# Patient Record
Sex: MALE | Race: WHITE | Employment: FULL TIME | URBAN - METROPOLITAN AREA
[De-identification: names, ages, dates, MRNs, and addresses within clinical notes are randomized per-mention and may not be internally consistent; named-entity substitution may affect disease eponyms.]

---

## 2017-01-01 ENCOUNTER — HOSPITAL ENCOUNTER (OUTPATIENT)
Dept: OTHER | Age: 52
Discharge: HOME | End: 2017-01-31
Attending: ORTHOPAEDIC SURGERY | Admitting: ORTHOPAEDIC SURGERY

## 2017-05-15 ENCOUNTER — OFFICE VISIT (OUTPATIENT)
Dept: ORTHOPEDIC SURGERY | Age: 52
End: 2017-05-15

## 2017-05-15 VITALS
HEIGHT: 73 IN | DIASTOLIC BLOOD PRESSURE: 93 MMHG | HEART RATE: 73 BPM | WEIGHT: 220 LBS | BODY MASS INDEX: 29.16 KG/M2 | SYSTOLIC BLOOD PRESSURE: 143 MMHG | TEMPERATURE: 97.5 F

## 2017-05-15 DIAGNOSIS — Z96.641 H/O TOTAL HIP ARTHROPLASTY, RIGHT: Primary | ICD-10-CM

## 2017-05-15 PROCEDURE — 99213 OFFICE O/P EST LOW 20 MIN: CPT | Performed by: ORTHOPAEDIC SURGERY

## 2018-06-28 ENCOUNTER — OFFICE VISIT (OUTPATIENT)
Dept: ORTHOPEDIC SURGERY | Age: 53
End: 2018-06-28

## 2018-06-28 VITALS
RESPIRATION RATE: 16 BRPM | HEART RATE: 80 BPM | TEMPERATURE: 98.8 F | SYSTOLIC BLOOD PRESSURE: 143 MMHG | HEIGHT: 73 IN | DIASTOLIC BLOOD PRESSURE: 85 MMHG | WEIGHT: 203 LBS | BODY MASS INDEX: 26.9 KG/M2

## 2018-06-28 DIAGNOSIS — Z96.641 H/O TOTAL HIP ARTHROPLASTY, RIGHT: Primary | ICD-10-CM

## 2018-06-28 PROCEDURE — G8427 DOCREV CUR MEDS BY ELIG CLIN: HCPCS | Performed by: PHYSICIAN ASSISTANT

## 2018-06-28 PROCEDURE — 99213 OFFICE O/P EST LOW 20 MIN: CPT | Performed by: PHYSICIAN ASSISTANT

## 2018-06-28 PROCEDURE — 1036F TOBACCO NON-USER: CPT | Performed by: PHYSICIAN ASSISTANT

## 2018-06-28 PROCEDURE — 3017F COLORECTAL CA SCREEN DOC REV: CPT | Performed by: PHYSICIAN ASSISTANT

## 2018-06-28 PROCEDURE — G8419 CALC BMI OUT NRM PARAM NOF/U: HCPCS | Performed by: PHYSICIAN ASSISTANT

## 2019-08-26 ENCOUNTER — OFFICE VISIT (OUTPATIENT)
Dept: ORTHOPEDIC SURGERY | Age: 54
End: 2019-08-26
Payer: COMMERCIAL

## 2019-08-26 VITALS
HEIGHT: 73 IN | WEIGHT: 230 LBS | BODY MASS INDEX: 30.48 KG/M2 | HEART RATE: 89 BPM | SYSTOLIC BLOOD PRESSURE: 139 MMHG | DIASTOLIC BLOOD PRESSURE: 89 MMHG | TEMPERATURE: 98.8 F

## 2019-08-26 DIAGNOSIS — Z96.641 HISTORY OF TOTAL HIP REPLACEMENT, RIGHT: Primary | ICD-10-CM

## 2019-08-26 PROCEDURE — 3017F COLORECTAL CA SCREEN DOC REV: CPT | Performed by: ORTHOPAEDIC SURGERY

## 2019-08-26 PROCEDURE — G8419 CALC BMI OUT NRM PARAM NOF/U: HCPCS | Performed by: ORTHOPAEDIC SURGERY

## 2019-08-26 PROCEDURE — G8427 DOCREV CUR MEDS BY ELIG CLIN: HCPCS | Performed by: ORTHOPAEDIC SURGERY

## 2019-08-26 PROCEDURE — 1036F TOBACCO NON-USER: CPT | Performed by: ORTHOPAEDIC SURGERY

## 2019-08-26 PROCEDURE — 99213 OFFICE O/P EST LOW 20 MIN: CPT | Performed by: ORTHOPAEDIC SURGERY

## 2019-08-26 NOTE — PROGRESS NOTES
This dictation was done with doggylooton dictation and may contain mechanical errors related to translation. Blood pressure 139/89, pulse 89, temperature 98.8 °F (37.1 °C), height 6' 1\" (1.854 m), weight 230 lb (104.3 kg).

## 2020-10-13 ENCOUNTER — TELEPHONE (OUTPATIENT)
Dept: ORTHOPEDIC SURGERY | Age: 55
End: 2020-10-13

## 2020-12-10 ENCOUNTER — PREP FOR PROCEDURE (OUTPATIENT)
Dept: ORTHOPEDIC SURGERY | Age: 55
End: 2020-12-10

## 2020-12-10 ENCOUNTER — OFFICE VISIT (OUTPATIENT)
Dept: ORTHOPEDIC SURGERY | Age: 55
End: 2020-12-10
Payer: COMMERCIAL

## 2020-12-10 VITALS — WEIGHT: 230 LBS | HEIGHT: 73 IN | BODY MASS INDEX: 30.48 KG/M2 | TEMPERATURE: 98.6 F

## 2020-12-10 PROBLEM — M16.12 PRIMARY OSTEOARTHRITIS OF LEFT HIP: Status: ACTIVE | Noted: 2020-12-10

## 2020-12-10 PROBLEM — Z96.641 HISTORY OF RIGHT HIP REPLACEMENT: Status: ACTIVE | Noted: 2020-12-10

## 2020-12-10 PROCEDURE — G8484 FLU IMMUNIZE NO ADMIN: HCPCS | Performed by: PHYSICIAN ASSISTANT

## 2020-12-10 PROCEDURE — G8419 CALC BMI OUT NRM PARAM NOF/U: HCPCS | Performed by: PHYSICIAN ASSISTANT

## 2020-12-10 PROCEDURE — 3017F COLORECTAL CA SCREEN DOC REV: CPT | Performed by: PHYSICIAN ASSISTANT

## 2020-12-10 PROCEDURE — G8427 DOCREV CUR MEDS BY ELIG CLIN: HCPCS | Performed by: PHYSICIAN ASSISTANT

## 2020-12-10 PROCEDURE — 99214 OFFICE O/P EST MOD 30 MIN: CPT | Performed by: PHYSICIAN ASSISTANT

## 2020-12-10 PROCEDURE — 1036F TOBACCO NON-USER: CPT | Performed by: PHYSICIAN ASSISTANT

## 2020-12-10 NOTE — LETTER
[facility]  Surgery Scheduling Form:  [facility]    DEMOGRAPHICS:                                                                                                                  Patient Name:  Bhavesh Galicia  Patient :  1965   Patient SS#:      Patient Phone:  [phone] (home) [phone] (work)                            Patient Address:  [id number] [facility] [address]    PCP:  [address] [place] [address] Ehrhardt:   Payor/Plan Subscr  Sex Relation Sub. Ins. ID Effective Group Num   1. [facility]* 1965 Male Self [id number]                                   [address]     DIAGNOSIS & PROCEDURE:                                                                                              Diagnosis:   Left hip arthritis    M16.12  Operation:  Left Anterior Total Hip Replacement with C-Arm   [address]  Location: [facility]  Surgeon:  Pastor Compa MD    [facility] INFORMATION:                                                                                         .  Surgeon's Scheduling Instruction:  elective    Requested Date:    OR Time:   Patient Arrival Time:      OR Time Required:  90  Minutes  Anesthesia:  General  Equipment:  Freeland Table, Depuy, advanced            COVID:  Mini C-Arm:  No   Standard C-Arm:  Yes  Status:  OUTPATIENT  PAT Required:  Yes  Comments: NO femoral nerve block    ALLERGIES:Patient has no known allergies.                      Blease Blizzard, MD      12/10/20 8:26 AM  BILLING INFORMATION:                                                                                                       Procedure:       CPT Code Modifier    With Mike Zeng [id number] [address]                                              H&P AT :      PCP ___XX__                  URGENT CARE ______    Bhavesh Galicia LEFT TOTAL HIP REPLACEMENT               1965                                                 PHYSICIANS ORDER HEIGHT  6'1                  WEIGHT   230     ALLERGIES:Patient has no known allergies. SURG              JET                            ________________________________________________________________  PRE-OP ORDERS:  ? CBC WITH DIFFERENTIAL                                                ? TYPE AND SCREEN   ? VITAMIN D LEVELS  ? PREALBUMIN AND ALBUMIN LEVELS                                                           ? HgB A1C                                                                               ? EKG                                                                                        ? NASAL CULUTRE MRSA  ? UAR/if positive repeat UAR on admissioin  ? BMP  ? COAG PROFILE  ? SED RATE  ? PT/OT EVAL AND TEACHING  ? INSTRUCT PT TO STOP ALL NSAIDS, ASPIRIN, BLOOD THINNERS 7 DAYS PRIOR SURGERY  DAY OF SURGERY  ? CEFAZOLIN 2 GM IVPB; IF PATIENT WEIGHS > 80 KG AND SERUM CREATININE <2.5 mg/dl, GIVE 2 GM DOSE WITHIN 1 HOUR OF INCISION. ? IF THE PRE-OP NASAL CULTURE FOR MRSA WAS POSITIVE:   REPEAT NASAL SWAB ON ADMISSION AND ADMINISTER VANCOMYCIN 1 GM IVPB, REDUCE THE DOSE OF VANCOMYCIN  MG IVPB IF PT < 55 KG OR SERUM CREATININE > 2mg/dl; Also to get 2 GM Cefazolin or wt based  ? All patients will receive preop Cefazolin 2 GM or wt based  ? APPLY KNEE HIGH ANTI-EMBOLIC AND PNEUMO-BOOTS TO UNOPERATIVE  LEG  ? CELEBREX  200 MG  ORALLY  DAY OF SURGERY  ?  ROXICODONE 10MG  ORALLY DAY OF SURGERY    OTHER ORDERS:  ____________________________________________________________  PHYSICIAN SIGNATURE:   12/10/2020 8:26 AM   __________________________DATE:         Supplemental Confidentiality & Payment Agreement & Supplemental HIPAA Notice for Shared Medical Appointments During shared medical appointments, you will hear about other participants health issues and personal information. As a matter of trust, it is your duty to keep everything you hear confidential.  Nothing that identifies a participant in any way (including job, ethnicity, Mandaeism, etc.) can be shared outside of this group setting. I have read and agree to the following statements:        · I agree to meet with a group of patients and my doctor. I understand that I have the choice to be seen by my physician in this group or individually and that I may leave the group visit anytime I feel uncomfortable. · I understand that discussions will occur regarding individual identifiable health information during the shared medical appointment and I will maintain the confidentiality of [facility] health information or other information heard during the appointment. · I am committed to maintaining this confidentiality even if I am no longer participating in shared medical appointments. · I understand that the information I share with the physician and staff will be heard by the other participants and there is a possibility that the information disclosed in the shared medical appointment may be re-disclosed by other participants. I have been notified of this potential disclosure and I voluntarily agree to participate in the shared medical appointment. · I know that I dont have to share any personal information with the group or health care providers unless, I choose to do so. · Like any doctors appointment, I agree to be responsible for the bill and / or co-payment associated with this physician appointment. Shared Medical Appointment              THIS SUPPLEMENTAL NOTICE SUPPLEMENTS AND DOES NOT REPLACE THE [facility] CONSENT, PATIENT RESPONSIBILITY AND NOTICE OF PRIVACY PRACTICE.         Mkora Agent    1965 Patients Signature: ____________________________________________________         DATE: ____/____/___      Florenica List / Support Persons Signature (if applicable) _________________________     DATE: __/__/__    **Each person will be asked to sign this commitment before each Shared Medical Appointment. Thank You ! SURGERY SCHEDULING TIMES                                                                                                                                                      Sarah Saunders                                                                                       1965                                                                           SURGERY DATE: ___/___/___                                                                      SURGERY TIME:  ___:___ AM/PM                                                                      ARRIVAL TIME:   ___:___  AM/PM                                                                                                                       **REPORT TO THE INFORMATION                                                  DESK IN THE MAIN LOBBY OF [id number] [facility]  Surgery Scheduler    [facility]) Physicians  [facility]  [address] [place] [address]  [employer]    [phone]  Phone                       JET INFO     PT NAME:  Sarah Saunders              Patient Phone:  [phone] (home) [phone] (work)                                          1965    PCP:  PCP:  Maricruz Nogueira                APPT DATE:  ___/___/___      DATE:  12/10/20        H&P __________    LABS: _________                      NEEDED:  __________ EKG:   _________                     NEEDED:  __________      JET DATE:   _______/_______      SURG DATE:   ________/________

## 2020-12-11 NOTE — PROGRESS NOTES
Subjective:      Patient ID: Lakia Beck is a 54 y.o.  male. Chief Complaint   Patient presents with    Follow-up     right total hip arthroplasty 10/19/16        HPI: He is here for follow-up on his right hip arthroplasty performed by Dr. Marcela Sultana 10/19/2016. Right hip is doing well, no issues or complaints. He has progressively worsening left hip pain. Left hip symptoms started approximately 2 years ago. He has noticed decreased range of motion, increased pain with prolonged standing and walking. Pain currently 8/10 in the left groin. He does have a family history for arthritis. He denies any prior history of injury or prior left hip surgery. He denies diabetes, tobacco, narcotic use. He does have a history of obstructive sleep apnea. He denies any allergies to penicillin. He denies any history of prior DVT. He does have occasional low back pain. Review of Systems:   Negative for fever or chills. Negative for numbness or tingling in the lower extremity. Past Medical History:   Diagnosis Date    Arthritis     High blood pressure     Hyperlipidemia     Skin cancer of scalp        Family History   Problem Relation Age of Onset    Cancer Father     High Blood Pressure Other        Past Surgical History:   Procedure Laterality Date    EYE SURGERY Bilateral     eye muscle as a child    JOINT REPLACEMENT Right 10/19/2016    anterior    SHOULDER SURGERY      TUMOR EXCISION      ca of scalp       Social History     Occupational History    Occupation:    Tobacco Use    Smoking status: Never Smoker    Smokeless tobacco: Never Used   Substance and Sexual Activity    Alcohol use:  Yes     Alcohol/week: 0.0 standard drinks    Drug use: No    Sexual activity: Not on file       Current Outpatient Medications   Medication Sig Dispense Refill    amLODIPine (NORVASC) 5 MG tablet Take 5 mg by mouth daily      atorvastatin (LIPITOR) 20 MG tablet       embolism and need for further surgical procedures. Aguilar Sawyer history and clinical findings, x-rays as well as past medical history where reviewed with Dr Kateryna Felipe. Dr Kateryna Felipe did have face to face time with Aguilar Sawyer. All questions where answered to the his satisfaction. Aguilar Sawyer will be tentatively scheduled with Dr. Lady Oilvares sometime early next year. Follow Up: Within the next 2 to 3 weeks with Dr. Madison Peres for surgical consultation. Call or return to clinic prn if these symptoms worsen or fail to improve as anticipated.

## 2020-12-16 ENCOUNTER — TELEPHONE (OUTPATIENT)
Dept: ORTHOPEDIC SURGERY | Age: 55
End: 2020-12-16

## 2021-04-23 ENCOUNTER — TELEPHONE (OUTPATIENT)
Dept: ORTHOPEDIC SURGERY | Age: 56
End: 2021-04-23

## 2021-07-19 ENCOUNTER — PREP FOR PROCEDURE (OUTPATIENT)
Dept: ORTHOPEDIC SURGERY | Age: 56
End: 2021-07-19

## 2021-07-19 ENCOUNTER — OFFICE VISIT (OUTPATIENT)
Dept: ORTHOPEDIC SURGERY | Age: 56
End: 2021-07-19
Payer: COMMERCIAL

## 2021-07-19 VITALS
BODY MASS INDEX: 30.48 KG/M2 | SYSTOLIC BLOOD PRESSURE: 174 MMHG | HEIGHT: 73 IN | WEIGHT: 230 LBS | HEART RATE: 101 BPM | DIASTOLIC BLOOD PRESSURE: 100 MMHG

## 2021-07-19 DIAGNOSIS — M16.12 PRIMARY OSTEOARTHRITIS OF LEFT HIP: Primary | ICD-10-CM

## 2021-07-19 DIAGNOSIS — M25.552 HIP PAIN, CHRONIC, LEFT: Primary | ICD-10-CM

## 2021-07-19 DIAGNOSIS — G89.29 HIP PAIN, CHRONIC, LEFT: Primary | ICD-10-CM

## 2021-07-19 DIAGNOSIS — M16.12 PRIMARY OSTEOARTHRITIS OF LEFT HIP: ICD-10-CM

## 2021-07-19 PROCEDURE — G8427 DOCREV CUR MEDS BY ELIG CLIN: HCPCS | Performed by: PHYSICIAN ASSISTANT

## 2021-07-19 PROCEDURE — 1036F TOBACCO NON-USER: CPT | Performed by: PHYSICIAN ASSISTANT

## 2021-07-19 PROCEDURE — 99214 OFFICE O/P EST MOD 30 MIN: CPT | Performed by: PHYSICIAN ASSISTANT

## 2021-07-19 PROCEDURE — G8417 CALC BMI ABV UP PARAM F/U: HCPCS | Performed by: PHYSICIAN ASSISTANT

## 2021-07-19 PROCEDURE — 3017F COLORECTAL CA SCREEN DOC REV: CPT | Performed by: PHYSICIAN ASSISTANT

## 2021-07-19 NOTE — LETTER
[facility]  Surgery Scheduling Form:  [facility]    DEMOGRAPHICS:                                                                                                                Patient Name:  Marisa Gómez  Patient :  1965   Patient SS#:      Patient Phone:  [phone] (home) [phone] (work)                            Patient Address:  [id number] [place] [address]    PCP:  [address] [place] [address] [place]:   Payor/Plan Subscr  Sex Relation Sub. Ins. ID Effective Group Num   1. [facility]* 1965 Male Self [id number]                                   [address]     DIAGNOSIS & PROCEDURE:                                                                                              Diagnosis:   Left hip arthritis   M16.12  Operation:  Left Anterior Total Hip Replacement with C-Arm   [address]  Location: [facility]  Surgeon:  Suzi Ayala MD    [facility] INFORMATION:                                                                                         .  Surgeon's Scheduling Instruction:   Date:  9-15  OR 9:25:   Patient Arrival Time:      OR Time Required:  80  Minutes  Anesthesia:  General  Equipment:  Bel[place]i Table, Depuy, advanced            COVID: bringing card  Mini C-Arm:  No   Standard C-Arm:  Yes  Status:  same day admit  PAT Required:  Yes  Comments: NO femoral nerve block    ALLERGIES:Patient has no known allergies.                      Chuck Leonard MD      21 3:46 PM  BILLING INFORMATION:                                                                                                       Procedure:       CPT Code Modifier    With Abdon Tucker2 [place]                                                H&P AT :      PCP ___XX__                  URGENT CARE ______    Marisa Gómez  LEFT TOTAL HIP REPLACEMENT               1965 PHYSICIANS ORDER                                      HEIGHT:   6'1                  WEIGHT:    0     ALLERGIES:Patient has no known allergies. SURG   9-15               JET     8-30                         ________________________________________________________________  PRE-OP ORDERS:  ? CBC WITH DIFFERENTIAL                                                ? TYPE AND SCREEN   ? VITAMIN D LEVELS  ? PREALBUMIN AND ALBUMIN LEVELS                                                           ? HgB A1C                                                                               ? EKG                                                                                        ? NASAL CULUTRE MRSA  ? UAR/if positive repeat UAR on admissioin  ? BMP  ? COAG PROFILE  ? SED RATE  ? PT/OT EVAL AND TEACHING  ? INSTRUCT PT TO STOP ALL NSAIDS, ASPIRIN, BLOOD THINNERS 7 DAYS PRIOR SURGERY  DAY OF SURGERY  ? CEFAZOLIN 2 GM IVPB; IF PATIENT WEIGHS > 80 KG AND SERUM CREATININE <2.5 mg/dl, GIVE 2 GM DOSE WITHIN 1 HOUR OF INCISION. ? IF THE PRE-OP NASAL CULTURE FOR MRSA WAS POSITIVE:   REPEAT NASAL SWAB ON ADMISSION AND ADMINISTER VANCOMYCIN 15 mg X kg, REDUCE THE DOSE OF VANCOMYCIN  MG IVPB IF PT < 55 KG OR SERUM CREATININE > 2mg/dl; Also to get 2 GM Cefazolin or wt based  ? All patients will receive preop Cefazolin 2 GM or wt based  ? APPLY KNEE HIGH ANTI-EMBOLIC AND PNEUMO-BOOTS TO UNOPERATIVE  LEG  ? CELEBREX  200 MG  ORALLY  DAY OF SURGERY  ?  ROXICODONE 10MG  ORALLY DAY OF SURGERY    OTHER ORDERS:  ____________________________________________________________  PHYSICIAN SIGNATURE:   7/19/2021 3:46 PM   __________________________DATE:

## 2021-07-20 NOTE — PROGRESS NOTES
gentleman no acute distress he is alert and oriented x3 he is able to walk well without antalgia but he has limited internal and external rotation of that left hip. He has fair hip flexion abduction strength bilaterally he has good distal pulses good dorsiflexion plantarflexion strength. Neuro exam grossly intact both lower extremities. Intact sensation to light touch. Motor exam 4+ to 5/5 in all major motor groups. Negative Alva's sign. Skin is warm, dry and intact with out erythema or significant increased temperature around the knee joint(s). There are no cutaneous lesions or lymphadenopathy present. X-RAYS:  X-rays taken at the office today show joint space narrowing subchondral sclerosis and now bone-on-bone osteoarthritis in the unstable joint of the left hip. The right hip has a well aligned well sized and fit total hip replacement. Assessment:  Left hip advanced osteoarthritis with stable right total hip replacement    Plan:  During today's visit, there was approximately 40 minutes of face-to-face discussion in regards to the patient's current condition and treatment options. More than 50 % of the time was counseling and coordination of care as indicated above. The patient Dr. Robles Martinez and I had lengthy discussions about the short and long-term expectations of his knee.   This point I think proceeding to a left total hip replacement from the anterior approach is in his best interest.  Dr. Robles Martinez took the time to go through the risk benefits and rationale treatment      PROCEDURE NOTE:   Schedule left anterior total hip replacement      They will schedule a follow up in preop

## 2021-08-09 ENCOUNTER — HOSPITAL ENCOUNTER (OUTPATIENT)
Dept: PHYSICAL THERAPY | Age: 56
Setting detail: THERAPIES SERIES
Discharge: HOME | End: 2021-08-09
Payer: COMMERCIAL

## 2021-08-09 PROCEDURE — 97110 THERAPEUTIC EXERCISES: CPT

## 2021-08-09 PROCEDURE — 97161 PT EVAL LOW COMPLEX 20 MIN: CPT

## 2021-08-09 PROCEDURE — 97530 THERAPEUTIC ACTIVITIES: CPT

## 2021-08-09 NOTE — PROGRESS NOTES
[facility]. [address]  Phone: [phone]   Fax:     [phone]          Physical Therapy Certification    Dear Referring Practitioner: JESUS ALBERTO Gayle,    We had the pleasure of evaluating the following patient for physical therapy services at [facility]. A summary of our findings can be found in the initial assessment below. This includes our plan of care. If you have any questions or concerns regarding these findings, please do not hesitate to contact me at the office phone number checked above. Thank you for the referral.       Physician Signature:_______________________________Date:__________________  By signing above (or electronic signature), therapists plan is approved by physician        Patient: Layla Conti   : 1965   MRN: [id number]  Referring Physician: Referring Practitioner: JESUS ALBERTO Gayle      Evaluation Date: 2021      Medical Diagnosis Information:  Diagnosis: M16.12 (ICD-10-CM) - Primary osteoarthritis of left hip   Treatment Diagnosis: Decreased left hip ROM and strength                                         Insurance information: PT Insurance Information: [facility]     Precautions/ Contra-indications:   Latex Allergy:  [x]NO      []YES  Preferred Language for Healthcare:   [x]English       []other:    C-SSRS Triggered by Intake questionnaire (Past 2 wk assessment ):   [x] No, Questionnaire did not trigger screening.   [] Yes, Patient intake triggered C-SSRS Screening      [] C-SSRS Screening completed  [] PCP notified via Epic     SUBJECTIVE: Patient stated complaint:Pt states he is getting a left THR on 9/15/21. Pain is 6-8/10, depending on activity. Has trouble putting shoes on and off. Hasn't been able to walk or hike.      Relevant Medical History: R JOON, HTN, history of skin cancer  Functional Outcome Measure: LEFS= 36     Pain Scale: 6-8/10  Easing factors: rest  Provocative factors: walking     Type: [x]Constant   []Intermittent  []Radiating []Localized []other:     Numbness/Tingling:     Occupation/School:    Hospital:    [x] Total Hip Replacement [] Right  [] Left  DOS: 9/15/21  [] Not yet scheduled  [] Total Knee Replacement [] Right  [x] Left    [x] Prehab Eval  [] Prehab D/C  [] Post - OP Visit             Weeks from sx [] Post-op D/C    DME ASSESSMENT:   Current available equipment:    [] Std. [place]       [x] Rolling walker      [] 4 wheeled walker     [] Cat Aditya     [x] Straight cane     [] Crutches   [] Reacher            [] Sock Aid              [x] Shower chair            [] Leg           [] Long handle shoe horn   [] Other:     Equipment needed at discharge from hospital:   [] Std. [place]       [] Rolling walker      [] 4 wheeled walker     [] Cat Aditya     [] Straight cane     [] Crutches   [] Reacher            [] Sock Aid              [] Shower chair            [] Leg           [] Long handle shoe horn   [] Other:       SOCIAL ASSESSMENT:  1. Will you live with someone who can care for you after surgery? [x] Yes  [] No  2. Where is the bathroom located in your post-surgery place of residence? [x] 1st Floor [] 2nd Floor  3. Where is the bedroom located in your post-surgery place of residence? [] 1st Floor [x] 2nd Floor- can sleep on 1st floor  4. Do you use community supports (home help, meals-on-wheels, district nurse)? [x] None or 1 per week  [] 2 or more per week  5. How many stairs will you have to climb to get in to your place of residence? [x] Less than 5  [] More than 5  6. Have you had a fall in the past year? [] Yes  [x] No     Notes: 2 МАРИЯ without HR    Available PT Visits:      BMI:    Height    Weight      FUNCTIONAL ASSESSMENT:   1. Do you use ambulatory aids?    [x] None [] Single [facility]  [] Crutches/Walker/Wheelchair  2. How far on average can you walk? [x] 2 Blocks or more  [] 1-2 Blocks  [] House bound most of the time  3. What is your physical activity level? [x] Highly Active [] Active  [] Somewhat active  [] Sedentary     OBJECTIVE:   Palpation:     Quad:     Functional Mobility/Transfers:  WNL    Posture:     Bandages/Dressings/Incisions:     Gait: (include devices/WB status)     Dermatomes Normal Abnormal Comments   inguinal area (L1)       anterior mid-thigh (L2)      distal ant thigh/med knee (L3)      medial lower leg and foot (L4)      lateral lower leg and foot (L5)      posterior calf (S1)      medial calcaneus (S2)          Myotomes Normal Abnormal Comments   Hip flexion (L1-L2)      Knee extension (L2-L4)      Dorsiflexion (L4-L5)      Great Toe Ext (L5)      Ankle Eversion (S1-S2)      Ankle PF(S1-S2)          Reflexes Normal Abnormal Comments   S1-2 Seated achilles      S1-2 Prone knee bend      L3-4 Patellar tendon      Clonus      Babinski           PROM AROM    L R L R   Hip Flexion       Knee Flexion       Knee Extension           Strength (0-5) Left Right   Hip Flexion - supine     Hip Flexion - seated     Hip Abduction - sidelyling 50 50   Hip Adduction     Hip Extension     Quads 100 60   Hamstrings          Flexibility     Hamstrings (90/90) Mild tightness on left    ITB Mikey Shone)     Quads (Musay's)     Hip Flexor Raynelly Witt)          Girth     Mid patella     Suprapatellar         Joint mobility: patellofemoral    [x]Normal    []Hypo   []Hyper         Functional Testing  Sx Date  Prehab Date 8/9 Post-op Re-Eval Date  4 week F/U date  8 week F/U date  D/C Date       TUG (sec) 8       30 second sit to stand (reps) 14       6 minute walk (m) 400          Balance:    Narrowed JENA (sec) 10       Semi Tandem (sec)   10             Tandem (sec) 10                 SLS (sec) 10            Knee AROM L R L R L R L R L R   Flexion 145 145           Extension 0 0              Knee Ext: L R L intervention required. [] Falls Risk assessed and Patient requires intervention due to being higher risk   TUG score (>12s at risk):     [] Falls education provided, including         ASSESSMENT: Decreased left hip ROM and strength, pain that is affecting ability to walk and complete ADLs  Functional Impairments:     [x]Noted lumbar/proximal hip/LE joint hypomobility   [x]Decreased LE functional ROM   [x]Decreased core/proximal hip strength and neuromuscular control   [x]Decreased LE functional strength   []Reduced balance/proprioceptive control   []other:      Functional Activity Limitations (from functional questionnaire and intake)   [x]Reduced ability to tolerate prolonged functional positions   [x]Reduced ability or difficulty with changes of positions or transfers between positions   []Reduced ability to maintain good posture and demonstrate good body mechanics with sitting, bending, and lifting   []Reduced ability to sleep   [] Reduced ability or tolerance with driving and/or computer work   []Reduced ability to perform lifting, carrying tasks   [x]Reduced ability to squat   [x]Reduced ability to forward bend   [x]Reduced ability to ambulate prolonged functional periods/distances/surfaces   [x]Reduced ability to ascend/descend stairs   [x]Reduced ability to run, hop, cut or jump   []other:    Participation Restrictions   [x]Reduced participation in self care activities   [x]Reduced participation in home management activities   [x]Reduced participation in work activities   [x]Reduced participation in social activities. [x]Reduced participation in sport/recreation activities. Classification :    []Signs/symptoms consistent with post-surgical status including decreased ROM, strength and function.    []Signs/symptoms consistent with joint sprain/strain   []Signs/symptoms consistent with patella-femoral syndrome   [x]Signs/symptoms consistent with knee OA/hip OA   []Signs/symptoms consistent with internal Independent in HEP and progression per patient tolerance, in order to prevent re-injury. [] Progressing: [] Met: [] Not Met: [] Adjusted  2. All patient questions regarding expectations for rehab following upcoming surgery are answered.    [] Progressing: [] Met: [] Not Met: [] Adjusted    Long Term Goals: long term goals to be determined at re-eval following upcoming surgery    Electronically signed by:  Enedina Keith, PT

## 2021-08-13 ENCOUNTER — TELEPHONE (OUTPATIENT)
Dept: ORTHOPEDIC SURGERY | Age: 56
End: 2021-08-13

## 2021-08-19 NOTE — TELEPHONE ENCOUNTER
Auth: # [id number]    Date: 9/15/2021 thru 12/14/2021  Type of SX:  Outpatient/observation  Location: [facility]  CPT: 02307   DX Code: M16.12  SX area: Lt hip  Insurance: [facility]

## 2021-08-25 ENCOUNTER — PREP FOR PROCEDURE (OUTPATIENT)
Dept: ORTHOPEDICS UNIT | Age: 56
End: 2021-08-25

## 2021-08-29 RX ORDER — CELECOXIB 200 MG/1
200 CAPSULE ORAL ONCE
Status: CANCELLED | OUTPATIENT
Start: 2021-08-29 | End: 2021-08-29

## 2021-08-29 RX ORDER — OXYCODONE HYDROCHLORIDE 10 MG/1
10 TABLET ORAL ONCE
Status: CANCELLED | OUTPATIENT
Start: 2021-08-29 | End: 2021-08-29

## 2021-08-30 ENCOUNTER — HOSPITAL ENCOUNTER (OUTPATIENT)
Dept: PREADMISSION TESTING | Age: 56
Discharge: HOME | End: 2021-09-03
Payer: COMMERCIAL

## 2021-08-30 DIAGNOSIS — M16.12 PRIMARY OSTEOARTHRITIS OF LEFT HIP: ICD-10-CM

## 2021-08-30 LAB
ABO/RH: NORMAL
ALBUMIN SERPL-MCNC: 4.9 G/DL (ref 3.4–5)
ANION GAP SERPL CALCULATED.3IONS-SCNC: 18 MMOL/L (ref 3–16)
ANTIBODY SCREEN: NORMAL
APTT: 33.3 SEC (ref 26.2–38.6)
BASOPHILS ABSOLUTE: 0 K/UL (ref 0–0.2)
BASOPHILS RELATIVE PERCENT: 0.6 %
BILIRUBIN URINE: NEGATIVE
BLOOD, URINE: NEGATIVE
BUN BLDV-MCNC: 11 MG/DL (ref 7–20)
CALCIUM SERPL-MCNC: 10 MG/DL (ref 8.3–10.6)
CHLORIDE BLD-SCNC: 100 MMOL/L (ref 99–110)
CLARITY: CLEAR
CO2: 21 MMOL/L (ref 21–32)
COLOR: YELLOW
CREAT SERPL-MCNC: 0.8 MG/DL (ref 0.9–1.3)
EKG ATRIAL RATE: 109 BPM
EKG DIAGNOSIS: NORMAL
EKG P AXIS: 72 DEGREES
EKG P-R INTERVAL: 148 MS
EKG Q-T INTERVAL: 344 MS
EKG QRS DURATION: 92 MS
EKG QTC CALCULATION (BAZETT): 463 MS
EKG R AXIS: 75 DEGREES
EKG T AXIS: 50 DEGREES
EKG VENTRICULAR RATE: 109 BPM
EOSINOPHILS ABSOLUTE: 0.1 K/UL (ref 0–0.6)
EOSINOPHILS RELATIVE PERCENT: 1.3 %
ESTIMATED AVERAGE GLUCOSE: 111.2 MG/DL
GFR AFRICAN AMERICAN: >60
GFR NON-AFRICAN AMERICAN: >60
GLUCOSE BLD-MCNC: 114 MG/DL (ref 70–99)
GLUCOSE URINE: NEGATIVE MG/DL
HBA1C MFR BLD: 5.5 %
HCT VFR BLD CALC: 44 % (ref 40.5–52.5)
HEMOGLOBIN: 15.4 G/DL (ref 13.5–17.5)
INR BLD: 0.87 (ref 0.88–1.12)
KETONES, URINE: NEGATIVE MG/DL
LEUKOCYTE ESTERASE, URINE: NEGATIVE
LYMPHOCYTES ABSOLUTE: 1.8 K/UL (ref 1–5.1)
LYMPHOCYTES RELATIVE PERCENT: 24.9 %
MCH RBC QN AUTO: 30.4 PG (ref 26–34)
MCHC RBC AUTO-ENTMCNC: 35 G/DL (ref 31–36)
MCV RBC AUTO: 86.8 FL (ref 80–100)
MICROSCOPIC EXAMINATION: NORMAL
MONOCYTES ABSOLUTE: 0.6 K/UL (ref 0–1.3)
MONOCYTES RELATIVE PERCENT: 7.6 %
NEUTROPHILS ABSOLUTE: 4.9 K/UL (ref 1.7–7.7)
NEUTROPHILS RELATIVE PERCENT: 65.6 %
NITRITE, URINE: NEGATIVE
PDW BLD-RTO: 12.9 % (ref 12.4–15.4)
PH UA: 6 (ref 5–8)
PLATELET # BLD: 277 K/UL (ref 135–450)
PMV BLD AUTO: 7.3 FL (ref 5–10.5)
POTASSIUM SERPL-SCNC: 3.6 MMOL/L (ref 3.5–5.1)
PREALBUMIN: 50.3 MG/DL (ref 20–40)
PROTEIN UA: NEGATIVE MG/DL
PROTHROMBIN TIME: 9.8 SEC (ref 9.9–12.7)
RBC # BLD: 5.07 M/UL (ref 4.2–5.9)
SEDIMENTATION RATE, ERYTHROCYTE: 10 MM/HR (ref 0–20)
SODIUM BLD-SCNC: 139 MMOL/L (ref 136–145)
SPECIFIC GRAVITY UA: 1.01 (ref 1–1.03)
URINE REFLEX TO CULTURE: NORMAL
URINE TYPE: NORMAL
UROBILINOGEN, URINE: 0.2 E.U./DL
VITAMIN D 25-HYDROXY: 21.4 NG/ML
WBC # BLD: 7.4 K/UL (ref 4–11)

## 2021-08-30 PROCEDURE — 85730 THROMBOPLASTIN TIME PARTIAL: CPT

## 2021-08-30 PROCEDURE — 86850 RBC ANTIBODY SCREEN: CPT

## 2021-08-30 PROCEDURE — 86900 BLOOD TYPING SEROLOGIC ABO: CPT

## 2021-08-30 PROCEDURE — 93005 ELECTROCARDIOGRAM TRACING: CPT

## 2021-08-30 PROCEDURE — 80048 BASIC METABOLIC PNL TOTAL CA: CPT

## 2021-08-30 PROCEDURE — 81003 URINALYSIS AUTO W/O SCOPE: CPT

## 2021-08-30 PROCEDURE — 82040 ASSAY OF SERUM ALBUMIN: CPT

## 2021-08-30 PROCEDURE — 86901 BLOOD TYPING SEROLOGIC RH(D): CPT

## 2021-08-30 PROCEDURE — 85610 PROTHROMBIN TIME: CPT

## 2021-08-30 PROCEDURE — 82306 VITAMIN D 25 HYDROXY: CPT

## 2021-08-30 PROCEDURE — 85652 RBC SED RATE AUTOMATED: CPT

## 2021-08-30 PROCEDURE — 83036 HEMOGLOBIN GLYCOSYLATED A1C: CPT

## 2021-08-30 PROCEDURE — 84134 ASSAY OF PREALBUMIN: CPT

## 2021-08-30 PROCEDURE — 87641 MR-STAPH DNA AMP PROBE: CPT

## 2021-08-30 PROCEDURE — 93010 ELECTROCARDIOGRAM REPORT: CPT | Performed by: INTERNAL MEDICINE

## 2021-08-30 PROCEDURE — 85025 COMPLETE CBC W/AUTO DIFF WBC: CPT

## 2021-08-30 NOTE — PROGRESS NOTES
Patient attended JET class on 8/30/21. Patient verified surgery for Total Hip replacement and received patient information and educational JET folder including education handouts on covid-19 restrictions, ERAS, hand hygiene and preventing constipation. Interviews completed by PT, OT, and PAT. Labs and Tests completed as ordered/necessary. Patient given handout instructions on Pre-operative Showering techniques and the use of anti-septic 3 days before surgery. Anti-septic bottle given to patient to take home. Patient states no further questions or concerns. Patient provided orthopedic office and nurse navigator contact information. Patient provided with home health care agency list and skilled nursing facility list.    DOS: 9/15/21  Dr Fiona Taveras: home with [facility] and wife scott ;if applicable. Per Patient Will see/Saw PCP on 9/8/21.        Electronically signed by Yony Schmitt RN on 8/30/2021 at 2:38 PM

## 2021-08-31 ENCOUNTER — TELEPHONE (OUTPATIENT)
Dept: ORTHOPEDIC SURGERY | Age: 56
End: 2021-08-31

## 2021-08-31 LAB — MRSA SCREEN RT-PCR: NORMAL

## 2021-08-31 NOTE — TELEPHONE ENCOUNTER
Vitamin D level is low at 21.4. Instructed patient to take over-the-counter Vitamin D [id number] IUs daily.     I called the patient and left a message

## 2021-09-09 ENCOUNTER — OFFICE VISIT (OUTPATIENT)
Dept: ORTHOPEDIC SURGERY | Age: 56
End: 2021-09-09
Payer: COMMERCIAL

## 2021-09-09 VITALS — WEIGHT: 230 LBS | BODY MASS INDEX: 30.48 KG/M2 | RESPIRATION RATE: 15 BRPM | HEIGHT: 73 IN

## 2021-09-09 DIAGNOSIS — M16.12 PRIMARY OSTEOARTHRITIS OF LEFT HIP: Primary | ICD-10-CM

## 2021-09-09 PROCEDURE — G8417 CALC BMI ABV UP PARAM F/U: HCPCS | Performed by: ORTHOPAEDIC SURGERY

## 2021-09-09 PROCEDURE — G8427 DOCREV CUR MEDS BY ELIG CLIN: HCPCS | Performed by: ORTHOPAEDIC SURGERY

## 2021-09-09 PROCEDURE — 3017F COLORECTAL CA SCREEN DOC REV: CPT | Performed by: ORTHOPAEDIC SURGERY

## 2021-09-09 PROCEDURE — 99214 OFFICE O/P EST MOD 30 MIN: CPT | Performed by: ORTHOPAEDIC SURGERY

## 2021-09-09 PROCEDURE — 1036F TOBACCO NON-USER: CPT | Performed by: ORTHOPAEDIC SURGERY

## 2021-09-09 NOTE — DISCHARGE INSTR - COC
[facility]) Continuity of Care Form    Patient Name:  Lily Morgan  : 1965    MRN:  [id number]    Admit date:  (Not on file)  Discharge date:  9/15/2021    Code Status Order: Prior  Advance Directives: No    Admitting Physician: Prachi Stokes MD  PCP: Sam Wick    Discharging Nurse: [facility] Care Unit/Room#: No information available for this encounter. Discharging Unit Phone Number: [phone]    Emergency Contact:        Past Surgical History:  Past Surgical History:   Procedure Laterality Date    EYE SURGERY Bilateral     eye muscle as a child    JOINT REPLACEMENT Right 10/19/2016    anterior    SHOULDER SURGERY      TUMOR EXCISION      ca of scalp       Immunization History:   Immunization History   Administered Date(s) Administered    COVID-19, Pfizer, PF, 30mcg/0.3mL 2021, 2021       Active Problems:  Active Problems:    * No active hospital problems. *  Resolved Problems:    * No resolved hospital problems. *      Isolation/Infection:       Nurse Assessment:  Last Vital Signs: There were no vitals taken for this visit. Last documented pain score (0-10 scale):    Last Weight:   Wt Readings from Last 1 Encounters:   21 230 lb (104.3 kg)     Mental Status:  oriented and alert     IV Access:  - None    Nursing Mobility/ADLs:  Walking   Assisted  Transfer  Assisted  Bathing  Assisted  Dressing  Assisted  Toileting  [facility]   whole    Wound Care Documentation and Therapy:  Keep glued Prineo dressing intact. DO NOT remove. This is waterproof for showering. Doctor will evaluate wound at office visit 2 weeks after surgery to discuss removal.     Incision 10/19/16 Hip Right (Active)   Number of days: 8505        Elimination:  Urinary Catheter: None   Colostomy/Ileostomy: No  Continence:   · Bowel:  Yes  · Bladder: Yes  Date of Last BM: 9/15/2021    Intake/Output Summary (Last 24 hours)   No intake Score:     Discharging to Facility/ Agency   · Name:  [facility] care    · Address: [address] [place] [address]., [address] [place] [address]  · Phone: [phone]  · Fax: [phone]     / signature: Electronically signed by Kandi Gonzalez on 9/15/21 at 3:57 PM EDT  Activity:   Elevate your leg if swelling occurs in your ankle. Use elastic wraps/hose until swelling decreases.  Continue the exercise program as prescribed by physical therapists.  Take frequent walks.  Use walker, crutches, or cane with weight bearing instructions as indicated by the physical therapists.  Take rest periods often. Elevate leg during rest period.  Avoid sitting in low chairs or toilets without raised seats.  Keep knees apart. Sleep with a pillow between your legs.  Do not cross your legs, especially when putting on shoes and socks. Wound Care:   Cover the wound with a sterile gauze dressing and change daily as long as there is drainage.  Do not scrub wound. Pat it dry with a soft towel.  Dont apply any lotions or creams to your wound.  Check the incision every day for redness, swelling, or increase in drainage. Diet:   You can resume your normal diet. There are no limits on your diet due to your surgery.  Pain pills and activity changes may lead to constipation. To prevent this, use prune juice or bran cereals liberally. You may need to use a laxative such as Dulcolax, Senokot, or Milk of Magnesia.  Drink plenty of fluids. Medications:   Take pain pills if needed to maintain comfort.  Never drive while taking pain medicine.  Avoid over the counter medications until checking with your doctor.  Resume previous medications as instructed by your doctor. You will be on Aspirin or Eliquis  for 30 days only.   Stay off other anti-inflammatory medications (except Celebrex) while on blood thinners  Call Your Doctor If:  [facility] You have increased pain not controlled by medications.  Excessive swelling in your ankle.  You develop numbness, tingling, or decreased movement.  You have a fever greater than 100 degrees for a day or over 101 degrees at any one time.  Your wound becomes more reddened, starts draining, or opens.  If you fall. You have any questions about your recovery. ? Inform your family doctor/dentist or any other doctor who cares for you in the future that you have a joint replacement. You may need antibiotics for dental or surgical procedures if there is any evidence of infection present. ?  If you have required the use of insulin to control your blood sugar after surgery, follow up with your family doctor. ? Call your surgeons office to schedule your appointment to be seen after surgery. ? Make your appointment to continue physical therapy per doctors orders. ? Smoking cessation assistance can be obtained from your family doctor or by [address] @ [phone]    _______________________________   _____   _______________________  ____                Patient Signature              Date      Witness                               Date          Anterior Approach  The main positions and movements to avoid after an anterior approach include bending the hip back, turning your hip and leg out, or spreading your leg outward.  Don't stretch your hip back. Walk with short steps. Taking a longer step when leading with your nonoperated hip stretches the surgical hip back.  Don't kneel only on one knee. Kneeling only on the surgical hip stretches the hip back. Use both knees when you must kneel down.  Don't turn your foot out. Place a pillow next to your hip and leg to keep your leg from turning or rolling out while lying on your back in bed.  Don't twist your body away from your operated hip. This means don't stand with your toes pointed out. Keep the toes of your affected leg pointed forward when you stand, sit, or walk.  If you turn your body away from your surgical hip without pivoting your foot, your hip will be placed in an unsafe position. Remember to lift and turn your foot as you turn.  Don't swing your leg outward away from your body. This means scooting to the side in bed by supporting your surgical leg.  Don't put your leg in a straddling position, as though you are mounting a horse. This means preventing your leg from bending up and out when getting in or out of the bathtub.  Instead, hold your leg, and lift it straight up and over the edge of the tub

## 2021-09-09 NOTE — CARE COORDINATION
DATE OF JET PHONE INTERVIEW:  9/9/2021      HISTORY OF JOINT REPLACEMENT(S):    R. Hip October 2016      DC TRANSPORTATION:  Berny Jain  Ph:  [phone]      STEPS INTO HOME:  2 steps    STEPS TO BATHROOM/BEDROOM:  2-story. Will do steps when ready. 15 steps. DME NEEDS:      none        HOME ASSISTANCE - WHO WILL BE STAYING WITH YOU AT HOME FOR FIRST SEVERAL DAYS? Wife will be home. LENGTH OF STAY HAS BEEN DISCUSSED WITH THE PT, APPROPRIATE TO HIS/ HER PROCEDURE. PT HAS BEEN INFORMED THAT THEY WILL BE DISCHARGED WHEN THE PHYSICIAN DEEMS THEM MEDICALLY READY. MOST PATIENTS CAN EXPECT  TO BE IN THE HOSPITAL ONE NIGHT AS AN OBSERVATION ONLY, OR 1-2 DAYS AS AN ADMISSION FOR THOSE WITH MEDICAL HEALTH  ISSUES/COMPLICATIONS. CHOICES FOR HHC, DME VENDORS AND SKILLED/ REHAB FACILITIES PROVIDED TO PT DURING THIS INTERVIEW. HOME CARE CHOICE(S):  [facility]    SNF/REHAB CHOICES (S): n/a      MEDS TO BEDS FROM STYLIGHTY RETAIL:   Yes. Contact information for case management provided to pt. Will follow with therapies and social service. Faustina Hutson Sr.  Administrative Assist, Case Management  [phone]  Electronically signed by Faustina Hutson on 9/9/2021 at 11:43 AM

## 2021-09-10 NOTE — PROGRESS NOTES
[facility]    Chief Complaint: Left hip pain    HPI:  Sharath Dempsey is a 64 y.o. who is here in follow-up of left hip pain. He has a planned anterior left total hip arthroplasty procedure next week. For review, he has a long-term history of left hip pain. He underwent right total hip arthroplasty October 19, 2016 with Dr. Waldemar Perdomo. He is doing well regards to the right hip. He is now having left hip pain on a daily basis. The patient has difficulty putting on socks and shoes, difficulty getting out of a car, difficulty with ambulation and doing activities of daily living including pain at night. Pain at rest is 7 and with activities 9-10/10. He denies diabetes, history of blood clots, blood thinners, tobacco usage. Patient Active Problem List   Diagnosis    Arthritis of right hip    Primary osteoarthritis of left hip    History of right hip replacement       ROS:  Constitutional: denies fever, chills, weight loss  MSK: denies pain in other joints, muscle aches  Neurological: denies numbness, tingling, weakness    Exam:  Resp. rate 15, height 6' 1\" (1.854 m), weight 230 lb (104.3 kg). Appearance: sitting in exam room chair, appears to be in no acute distress, awake and alert  Resp: unlabored breathing on room air  Skin: warm, dry and intact with out erythema or significant increased temperature  Neuro: grossly intact both lower extremities. Intact sensation to light touch. Motor exam 4+ to 5/5 in all major motor groups. Left hip: Examination demonstrates pain with logroll and Stinchfield. There is brisk capillary refill. There are 2+ dorsalis pedis and posterior tibial pulses. Strength is 5/5 in hamstrings, quads, hip flexors. Imaging:  AP pelvis, AP and frog lateral views of the left hip were performed and interpreted today.   He has significant joint space narrowing, subchondral sclerosis, periarticular osteophytes due to osteoarthritis. Assessment:  Left hip osteoarthritis    Plan:  We discussed the diagnosis and treatment options. He is scheduled for left total hip arthroplasty. The operative procedure, alternatives, and risks were discussed in detail with the patient. The risks include but are not limited to: Infection, vessel injury, nerve injury, DVT, pulmonary embolism, implant loosening, need for revision surgery, leg length discrepancy, dislocation, lateral femoral cutaneous nerve palsy, intraoperative fracture. Despite these risks the patient would like to proceed. All questions have been answered and no guarantees were made. I discussed with the patient the diagnosis in detail and answered all questions. The patient verbalized understanding of the plan as it has been described above and is in agreement. Plan for anterior left hip arthroplasty. Total time spent on today's encounter was at least 33 minutes. This time included reviewing prior notes, radiographs, and lab results when available, reviewing history obtained by medical assistant, performing history and physical exam, reviewing tests/radiographs with the patient, counseling the patient, ordering medications or tests, documentation in the electronic health record, and coordination of care. This dictation was done with Dragon dictation and may contain mechanical errors related to translation.

## 2021-09-13 RX ORDER — AMLODIPINE BESYLATE 5 MG/1
5 TABLET ORAL DAILY
Status: ON HOLD | COMMUNITY
End: 2021-09-15 | Stop reason: ALTCHOICE

## 2021-09-13 NOTE — PROGRESS NOTES
Pt was requested to have Rapid Covid test to be done prior to surgery/procedure. If positive, pt must contact surgeon immediately or with any other questions.   Pt to bring covid results DOS

## 2021-09-13 NOTE — PROGRESS NOTES
C-Difficile admission screening and protocol:     * Admitted with diarrhea? YES____    NO__X___     *Prior history of C-Diff. In last 3 months? YES____   NO___X__     *Antibiotic use in the past 6-8 weeks? NO_X_____YES______                 If yes which  ANTIBIOTIC AND REASON______     *Prior hospitalization or nursing home in the last month?  YES____   NO__X__

## 2021-09-13 NOTE — PROGRESS NOTES
PRE-OP INSTRUCTIONS     · Do not eat or drink anything after 12:00 midnight prior to surgery. · This includes water, chewing gum, mints and ice chips. · You may brush your teeth and gargle the morning of surgery but DO  NOT SWALLOW THE WATER. Take the following medications with a small sip of water on the morning of procedure. ..    · You may be asked to stop blood thinners such as:  Coumadin, Plavix, Fragmin and lovenox. Please check with your doctor before stopping these or any other medications. · Aspirin, ibuprofen, advil and naproxen, any anti-inflammatory products should be stopped for a week prior to your surgery. · Do not smoke and do not drink any alcoholic beverages 24 hours prior to your surgery. · Please do not wear any jewelry or body piercings on the day of surgery. · Please wear something simple, loose fitting clothing to the hospital.  Do not wear any make-up(including eye make-up) or nail polish on your fingers and toes. As part of our patient safety program to minimize surgical infections, we ask you to do the following:    Please notify your surgeon if you develop any illness between now and the day of your surgery. This includes a cough, cold, fever, sore  throat, nausea, vomiting, diarrhea, etc.   Please notify your surgeon if you experience dizziness, shortness of  breath or blurred vision between now and the time of your surgery. Please notify your surgeon of any open or redden areas that may look infected       DO NOT shave your operative site 96 hours(four days) prior to surgery. Shower the week before surgery with an antibacterial soap such as:dial,safeguard, etc.       Three(3) days prior to your surgery, cleanse the operative site with Hibiclens(anti-microbial soap).  This soap may dry your skin, please do not apply any oils or lotions     · Please bring your insurance card and picture ID day of surgery    · If you have a living will or durable power of attorny. Please bring in a copy of you advanced directives. · If you have dentures, they will be removed before going to the OR, we will provide you with a container. If you wear contact lenses/glasses, they will be removes, please bring a case    · Have you seen your family doctor for a pre-op history and physical.      · Surgery scheduler will call you 48 hours prior to your surgery to notify you of the time of your surgery and the time you will need to be at hospital...patients are asked to arrive 2 1/2 hours prior to surgery. ·  Please call Pre-Admission testing if you have any further questions.                   [id number] [facility] [place] testing phone number:  [phone]      Thank You for choosing [facility]!!

## 2021-09-14 ENCOUNTER — ANESTHESIA EVENT (OUTPATIENT)
Dept: OPERATING ROOM | Age: 56
End: 2021-09-14
Payer: COMMERCIAL

## 2021-09-14 NOTE — ANESTHESIA PRE PROCEDURE
Department of Anesthesiology  Preprocedure Note       Name:  Niko Hayes   Age:  64 y.o.  :  1965                                          MRN:  [id number]         Date:  9/15/2021      Surgeon: Suzy Vazquez):  Charlotte Hughes MD    Procedure: Procedure(s):  LEFT ANTERIOR TOTAL HIP REPLACEMENT WITH C-ARM    Medications prior to admission:   Prior to Admission medications    Medication Sig Start Date End Date Taking?  Authorizing Provider   amLODIPine (NORVASC) 5 MG tablet Take 10 mg by mouth daily    Yes Historical Provider, MD   losartan-hydrochlorothiazide (HYZAAR) 100-25 MG per tablet Take 1 tablet by mouth daily  3/2/16  Yes Historical Provider, MD       Current medications:    Current Facility-Administered Medications   Medication Dose Route Frequency Provider Last Rate Last Admin    0.9 % sodium chloride infusion   IntraVENous Continuous Florencia Tay  mL/hr at 09/15/21 0651 New Bag at 09/15/21 0651    sodium chloride flush 0.9 % injection 10 mL  10 mL IntraVENous 2 times per day Florencia Tay MD        sodium chloride flush 0.9 % injection 10 mL  10 mL IntraVENous PRN Florencia Tay MD        0.9 % sodium chloride infusion  25 mL IntraVENous PRN Florencia Tay MD        ceFAZolin (ANCEF) 2000 mg in dextrose 5 % 100 mL IVPB  2,000 mg IntraVENous Once Charlotte Hughes MD           Allergies:  No Known Allergies    Problem List:    Patient Active Problem List   Diagnosis Code    Arthritis of right hip M16.11    Primary osteoarthritis of left hip M16.12    History of right hip replacement Z96.641       Past Medical History:        Diagnosis Date    Arthritis     High blood pressure     [place] (hard of hearing)     Hyperlipidemia     Skin cancer of scalp     Sleep apnea     uses a Cpap machine    Uses hearing aid     bilateral    Wears glasses        Past Surgical History:        Procedure Laterality Date    EYE SURGERY Bilateral     eye muscle as a child    JOINT REPLACEMENT Right 10/19/2016    anterior    SHOULDER SURGERY Right     scoped and labrum repair    SKIN BIOPSY      ca of scalp       Social History:    Social History     Tobacco Use    Smoking status: Never Smoker    Smokeless tobacco: Never Used   Substance Use Topics    Alcohol use: Yes     Alcohol/week: 0.0 standard drinks     Comment: socially                                Counseling given: Not Answered      Vital Signs (Current):   Vitals:    09/13/21 1436 09/15/21 0638   BP:  (!) 159/94   Pulse:  86   Resp:  18   Temp:  97.9 °F (36.6 °C)   TempSrc:  Temporal   SpO2:  98%   Weight: 230 lb (104.3 kg)    Height: 6' 1\" (1.854 m)                                               BP Readings from Last 3 Encounters:   09/15/21 (!) 159/94   07/19/21 (!) 174/100   08/26/19 139/89       NPO Status: Time of last liquid consumption: 2300                        Time of last solid consumption: 2300                        Date of last liquid consumption: 09/14/21                        Date of last solid food consumption: 09/14/21    BMI:   Wt Readings from Last 3 Encounters:   09/13/21 230 lb (104.3 kg)   09/09/21 230 lb (104.3 kg)   07/19/21 230 lb (104.3 kg)     Body mass index is 30.34 kg/m². CBC:   Lab Results   Component Value Date    WBC 7.4 08/30/2021    RBC 5.07 08/30/2021    HGB 15.4 08/30/2021    HCT 44.0 08/30/2021    MCV 86.8 08/30/2021    RDW 12.9 08/30/2021     08/30/2021       CMP:   Lab Results   Component Value Date     08/30/2021    K 3.6 08/30/2021     08/30/2021    CO2 21 08/30/2021    BUN 11 08/30/2021    CREATININE 0.8 08/30/2021    GFRAA >60 08/30/2021    LABGLOM >60 08/30/2021    GLUCOSE 114 08/30/2021    CALCIUM 10.0 08/30/2021       POC Tests: No results for input(s): POCGLU, POCNA, POCK, POCCL, POCBUN, POCHEMO, POCHCT in the last 72 hours. Coags:   Lab Results   Component Value Date    PROTIME 9.8 08/30/2021    INR 0.87 08/30/2021    APTT 33.3 08/30/2021       HCG (If Applicable):  No anesthesia, drug and allergy history). No interval changes to history and physical examination. Anesthetic plan, risks, benefits, alternatives, and personnel involved discussed with patient. Patient verbalized an understanding and agrees to proceed.       Daivd Bernheim, MD  September 15, 2021  7:01 AM

## 2021-09-15 ENCOUNTER — ANESTHESIA (OUTPATIENT)
Dept: OPERATING ROOM | Age: 56
End: 2021-09-15
Payer: COMMERCIAL

## 2021-09-15 ENCOUNTER — APPOINTMENT (OUTPATIENT)
Dept: GENERAL RADIOLOGY | Age: 56
End: 2021-09-15
Attending: ORTHOPAEDIC SURGERY
Payer: COMMERCIAL

## 2021-09-15 ENCOUNTER — HOSPITAL ENCOUNTER (OUTPATIENT)
Age: 56
Setting detail: OBSERVATION
Discharge: HOME | End: 2021-09-16
Attending: ORTHOPAEDIC SURGERY | Admitting: ORTHOPAEDIC SURGERY
Payer: COMMERCIAL

## 2021-09-15 VITALS
OXYGEN SATURATION: 100 % | DIASTOLIC BLOOD PRESSURE: 49 MMHG | RESPIRATION RATE: 1 BRPM | SYSTOLIC BLOOD PRESSURE: 87 MMHG | TEMPERATURE: 97 F

## 2021-09-15 DIAGNOSIS — M16.12 ARTHRITIS OF LEFT HIP: ICD-10-CM

## 2021-09-15 DIAGNOSIS — Z96.641 HISTORY OF RIGHT HIP REPLACEMENT: Primary | ICD-10-CM

## 2021-09-15 LAB
ABO/RH: NORMAL
ANTIBODY SCREEN: NORMAL
GLUCOSE BLD-MCNC: 125 MG/DL (ref 70–99)
GLUCOSE BLD-MCNC: 165 MG/DL (ref 70–99)
PERFORMED ON: ABNORMAL
PERFORMED ON: ABNORMAL

## 2021-09-15 PROCEDURE — 86901 BLOOD TYPING SEROLOGIC RH(D): CPT

## 2021-09-15 PROCEDURE — 7100000000 HC PACU RECOVERY - FIRST 15 MIN: Performed by: ORTHOPAEDIC SURGERY

## 2021-09-15 PROCEDURE — 27130 TOTAL HIP ARTHROPLASTY: CPT | Performed by: ORTHOPAEDIC SURGERY

## 2021-09-15 PROCEDURE — 2580000003 HC RX 258: Performed by: ANESTHESIOLOGY

## 2021-09-15 PROCEDURE — 6360000002 HC RX W HCPCS: Performed by: ORTHOPAEDIC SURGERY

## 2021-09-15 PROCEDURE — C1776 JOINT DEVICE (IMPLANTABLE): HCPCS | Performed by: ORTHOPAEDIC SURGERY

## 2021-09-15 PROCEDURE — 6370000000 HC RX 637 (ALT 250 FOR IP): Performed by: ORTHOPAEDIC SURGERY

## 2021-09-15 PROCEDURE — 27130 TOTAL HIP ARTHROPLASTY: CPT | Performed by: PHYSICIAN ASSISTANT

## 2021-09-15 PROCEDURE — 6360000002 HC RX W HCPCS: Performed by: NURSE ANESTHETIST, CERTIFIED REGISTERED

## 2021-09-15 PROCEDURE — 73501 X-RAY EXAM HIP UNI 1 VIEW: CPT

## 2021-09-15 PROCEDURE — 2580000003 HC RX 258: Performed by: ORTHOPAEDIC SURGERY

## 2021-09-15 PROCEDURE — 88304 TISSUE EXAM BY PATHOLOGIST: CPT

## 2021-09-15 PROCEDURE — 3700000001 HC ADD 15 MINUTES (ANESTHESIA): Performed by: ORTHOPAEDIC SURGERY

## 2021-09-15 PROCEDURE — 2500000003 HC RX 250 WO HCPCS: Performed by: NURSE ANESTHETIST, CERTIFIED REGISTERED

## 2021-09-15 PROCEDURE — 97162 PT EVAL MOD COMPLEX 30 MIN: CPT

## 2021-09-15 PROCEDURE — 97165 OT EVAL LOW COMPLEX 30 MIN: CPT

## 2021-09-15 PROCEDURE — 3700000000 HC ANESTHESIA ATTENDED CARE: Performed by: ORTHOPAEDIC SURGERY

## 2021-09-15 PROCEDURE — 86900 BLOOD TYPING SEROLOGIC ABO: CPT

## 2021-09-15 PROCEDURE — G0378 HOSPITAL OBSERVATION PER HR: HCPCS

## 2021-09-15 PROCEDURE — 3209999900 FLUORO FOR SURGICAL PROCEDURES

## 2021-09-15 PROCEDURE — 7100000001 HC PACU RECOVERY - ADDTL 15 MIN: Performed by: ORTHOPAEDIC SURGERY

## 2021-09-15 PROCEDURE — 96366 THER/PROPH/DIAG IV INF ADDON: CPT

## 2021-09-15 PROCEDURE — 97116 GAIT TRAINING THERAPY: CPT

## 2021-09-15 PROCEDURE — 94760 N-INVAS EAR/PLS OXIMETRY 1: CPT

## 2021-09-15 PROCEDURE — 2709999900 HC NON-CHARGEABLE SUPPLY: Performed by: ORTHOPAEDIC SURGERY

## 2021-09-15 PROCEDURE — 94660 CPAP INITIATION&MGMT: CPT

## 2021-09-15 PROCEDURE — 94150 VITAL CAPACITY TEST: CPT

## 2021-09-15 PROCEDURE — 86850 RBC ANTIBODY SCREEN: CPT

## 2021-09-15 PROCEDURE — 97535 SELF CARE MNGMENT TRAINING: CPT

## 2021-09-15 PROCEDURE — 73502 X-RAY EXAM HIP UNI 2-3 VIEWS: CPT

## 2021-09-15 PROCEDURE — 96365 THER/PROPH/DIAG IV INF INIT: CPT

## 2021-09-15 PROCEDURE — 2720000010 HC SURG SUPPLY STERILE: Performed by: ORTHOPAEDIC SURGERY

## 2021-09-15 PROCEDURE — 2580000003 HC RX 258: Performed by: NURSE ANESTHETIST, CERTIFIED REGISTERED

## 2021-09-15 PROCEDURE — 3600000015 HC SURGERY LEVEL 5 ADDTL 15MIN: Performed by: ORTHOPAEDIC SURGERY

## 2021-09-15 PROCEDURE — 3600000005 HC SURGERY LEVEL 5 BASE: Performed by: ORTHOPAEDIC SURGERY

## 2021-09-15 PROCEDURE — 88311 DECALCIFY TISSUE: CPT

## 2021-09-15 DEVICE — HEAD FEM DIA40MM +5MM OFFSET 12/14 TAPR HIP CERAMIC TI SL: Type: IMPLANTABLE DEVICE | Site: HIP | Status: FUNCTIONAL

## 2021-09-15 DEVICE — STEM FEM SZ 5 L105MM 12/14 TAPR HI OFFSET HIP DUOFIX CLLRD: Type: IMPLANTABLE DEVICE | Site: HIP | Status: FUNCTIONAL

## 2021-09-15 DEVICE — LINER ACET OD58MM ID40MM +4MM OFFSET HIP POLYETH MTL ON: Type: IMPLANTABLE DEVICE | Site: HIP | Status: FUNCTIONAL

## 2021-09-15 DEVICE — CUP ACET DIA58MM HIP GRIPTION PRI CEMENTLESS FIX SECT SER: Type: IMPLANTABLE DEVICE | Site: HIP | Status: FUNCTIONAL

## 2021-09-15 RX ORDER — DEXTROSE MONOHYDRATE 25 G/50ML
12.5 INJECTION, SOLUTION INTRAVENOUS PRN
Status: DISCONTINUED | OUTPATIENT
Start: 2021-09-15 | End: 2021-09-16

## 2021-09-15 RX ORDER — MELOXICAM 7.5 MG/1
7.5 TABLET ORAL DAILY
Qty: 5 TABLET | Refills: 0 | Status: SHIPPED | OUTPATIENT
Start: 2021-09-16 | End: 2021-09-21

## 2021-09-15 RX ORDER — SODIUM CHLORIDE 450 MG/100ML
INJECTION, SOLUTION INTRAVENOUS CONTINUOUS
Status: DISCONTINUED | OUTPATIENT
Start: 2021-09-15 | End: 2021-09-16

## 2021-09-15 RX ORDER — OXYCODONE HYDROCHLORIDE 10 MG/1
10 TABLET ORAL EVERY 4 HOURS PRN
Status: DISCONTINUED | OUTPATIENT
Start: 2021-09-15 | End: 2021-09-16 | Stop reason: HOSPADM

## 2021-09-15 RX ORDER — NICOTINE POLACRILEX 4 MG
15 LOZENGE BUCCAL PRN
Status: DISCONTINUED | OUTPATIENT
Start: 2021-09-15 | End: 2021-09-16

## 2021-09-15 RX ORDER — ONDANSETRON 2 MG/ML
4 INJECTION INTRAMUSCULAR; INTRAVENOUS
Status: ACTIVE | OUTPATIENT
Start: 2021-09-15 | End: 2021-09-15

## 2021-09-15 RX ORDER — KETOROLAC TROMETHAMINE 15 MG/ML
15 INJECTION, SOLUTION INTRAMUSCULAR; INTRAVENOUS PRN
Status: ACTIVE | OUTPATIENT
Start: 2021-09-15 | End: 2021-09-15

## 2021-09-15 RX ORDER — GLYCOPYRROLATE 0.2 MG/ML
INJECTION INTRAMUSCULAR; INTRAVENOUS PRN
Status: DISCONTINUED | OUTPATIENT
Start: 2021-09-15 | End: 2021-09-15 | Stop reason: SDUPTHER

## 2021-09-15 RX ORDER — LABETALOL HYDROCHLORIDE 5 MG/ML
5 INJECTION, SOLUTION INTRAVENOUS EVERY 10 MIN PRN
Status: DISCONTINUED | OUTPATIENT
Start: 2021-09-15 | End: 2021-09-16 | Stop reason: HOSPADM

## 2021-09-15 RX ORDER — MORPHINE SULFATE 2 MG/ML
2 INJECTION, SOLUTION INTRAMUSCULAR; INTRAVENOUS
Status: DISCONTINUED | OUTPATIENT
Start: 2021-09-15 | End: 2021-09-16 | Stop reason: HOSPADM

## 2021-09-15 RX ORDER — ONDANSETRON 2 MG/ML
INJECTION INTRAMUSCULAR; INTRAVENOUS PRN
Status: DISCONTINUED | OUTPATIENT
Start: 2021-09-15 | End: 2021-09-15 | Stop reason: SDUPTHER

## 2021-09-15 RX ORDER — MIDAZOLAM HYDROCHLORIDE 1 MG/ML
INJECTION INTRAMUSCULAR; INTRAVENOUS PRN
Status: DISCONTINUED | OUTPATIENT
Start: 2021-09-15 | End: 2021-09-15 | Stop reason: SDUPTHER

## 2021-09-15 RX ORDER — LIDOCAINE HYDROCHLORIDE 20 MG/ML
INJECTION, SOLUTION EPIDURAL; INFILTRATION; INTRACAUDAL; PERINEURAL PRN
Status: DISCONTINUED | OUTPATIENT
Start: 2021-09-15 | End: 2021-09-15 | Stop reason: SDUPTHER

## 2021-09-15 RX ORDER — SODIUM CHLORIDE 0.9 % (FLUSH) 0.9 %
10 SYRINGE (ML) INJECTION PRN
Status: DISCONTINUED | OUTPATIENT
Start: 2021-09-15 | End: 2021-09-16 | Stop reason: HOSPADM

## 2021-09-15 RX ORDER — MAGNESIUM HYDROXIDE 1200 MG/15ML
LIQUID ORAL CONTINUOUS PRN
Status: COMPLETED | OUTPATIENT
Start: 2021-09-15 | End: 2021-09-15

## 2021-09-15 RX ORDER — SENNA AND DOCUSATE SODIUM 50; 8.6 MG/1; MG/1
1 TABLET, FILM COATED ORAL 2 TIMES DAILY
Status: DISCONTINUED | OUTPATIENT
Start: 2021-09-15 | End: 2021-09-16 | Stop reason: HOSPADM

## 2021-09-15 RX ORDER — ASPIRIN 81 MG/1
81 TABLET ORAL 2 TIMES DAILY
Status: DISCONTINUED | OUTPATIENT
Start: 2021-09-15 | End: 2021-09-16 | Stop reason: HOSPADM

## 2021-09-15 RX ORDER — SODIUM CHLORIDE 9 MG/ML
25 INJECTION, SOLUTION INTRAVENOUS PRN
Status: DISCONTINUED | OUTPATIENT
Start: 2021-09-15 | End: 2021-09-16 | Stop reason: HOSPADM

## 2021-09-15 RX ORDER — DEXAMETHASONE SODIUM PHOSPHATE 4 MG/ML
INJECTION, SOLUTION INTRA-ARTICULAR; INTRALESIONAL; INTRAMUSCULAR; INTRAVENOUS; SOFT TISSUE PRN
Status: DISCONTINUED | OUTPATIENT
Start: 2021-09-15 | End: 2021-09-15 | Stop reason: SDUPTHER

## 2021-09-15 RX ORDER — ONDANSETRON 2 MG/ML
4 INJECTION INTRAMUSCULAR; INTRAVENOUS EVERY 6 HOURS PRN
Status: DISCONTINUED | OUTPATIENT
Start: 2021-09-15 | End: 2021-09-16 | Stop reason: HOSPADM

## 2021-09-15 RX ORDER — ASPIRIN 81 MG/1
81 TABLET ORAL 2 TIMES DAILY
Qty: 60 TABLET | Refills: 0 | Status: SHIPPED | OUTPATIENT
Start: 2021-09-15 | End: 2021-10-15

## 2021-09-15 RX ORDER — HYDRALAZINE HYDROCHLORIDE 20 MG/ML
5 INJECTION INTRAMUSCULAR; INTRAVENOUS EVERY 10 MIN PRN
Status: DISCONTINUED | OUTPATIENT
Start: 2021-09-15 | End: 2021-09-16 | Stop reason: HOSPADM

## 2021-09-15 RX ORDER — ACETAMINOPHEN 325 MG/1
650 TABLET ORAL EVERY 6 HOURS
Status: DISCONTINUED | OUTPATIENT
Start: 2021-09-15 | End: 2021-09-16 | Stop reason: HOSPADM

## 2021-09-15 RX ORDER — OXYCODONE HYDROCHLORIDE 10 MG/1
10 TABLET ORAL ONCE
Status: COMPLETED | OUTPATIENT
Start: 2021-09-15 | End: 2021-09-15

## 2021-09-15 RX ORDER — FENTANYL CITRATE 50 UG/ML
INJECTION, SOLUTION INTRAMUSCULAR; INTRAVENOUS PRN
Status: DISCONTINUED | OUTPATIENT
Start: 2021-09-15 | End: 2021-09-15 | Stop reason: SDUPTHER

## 2021-09-15 RX ORDER — CELECOXIB 200 MG/1
200 CAPSULE ORAL ONCE
Status: COMPLETED | OUTPATIENT
Start: 2021-09-15 | End: 2021-09-15

## 2021-09-15 RX ORDER — ONDANSETRON 4 MG/1
4 TABLET, ORALLY DISINTEGRATING ORAL EVERY 8 HOURS PRN
Status: DISCONTINUED | OUTPATIENT
Start: 2021-09-15 | End: 2021-09-16 | Stop reason: HOSPADM

## 2021-09-15 RX ORDER — OXYCODONE HYDROCHLORIDE 5 MG/1
5-10 TABLET ORAL EVERY 6 HOURS PRN
Qty: 40 TABLET | Refills: 0 | Status: SHIPPED | OUTPATIENT
Start: 2021-09-15 | End: 2021-09-20

## 2021-09-15 RX ORDER — DIPHENHYDRAMINE HYDROCHLORIDE 50 MG/ML
12.5 INJECTION INTRAMUSCULAR; INTRAVENOUS
Status: ACTIVE | OUTPATIENT
Start: 2021-09-15 | End: 2021-09-15

## 2021-09-15 RX ORDER — SODIUM CHLORIDE 9 MG/ML
INJECTION, SOLUTION INTRAVENOUS CONTINUOUS
Status: DISCONTINUED | OUTPATIENT
Start: 2021-09-15 | End: 2021-09-15

## 2021-09-15 RX ORDER — SODIUM CHLORIDE 0.9 % (FLUSH) 0.9 %
10 SYRINGE (ML) INJECTION PRN
Status: DISCONTINUED | OUTPATIENT
Start: 2021-09-15 | End: 2021-09-15 | Stop reason: HOSPADM

## 2021-09-15 RX ORDER — SODIUM CHLORIDE 0.9 % (FLUSH) 0.9 %
10 SYRINGE (ML) INJECTION EVERY 12 HOURS SCHEDULED
Status: DISCONTINUED | OUTPATIENT
Start: 2021-09-15 | End: 2021-09-16 | Stop reason: HOSPADM

## 2021-09-15 RX ORDER — KETOROLAC TROMETHAMINE 30 MG/ML
INJECTION, SOLUTION INTRAMUSCULAR; INTRAVENOUS PRN
Status: DISCONTINUED | OUTPATIENT
Start: 2021-09-15 | End: 2021-09-15 | Stop reason: SDUPTHER

## 2021-09-15 RX ORDER — DEXTROSE MONOHYDRATE 50 MG/ML
100 INJECTION, SOLUTION INTRAVENOUS PRN
Status: DISCONTINUED | OUTPATIENT
Start: 2021-09-15 | End: 2021-09-16

## 2021-09-15 RX ORDER — PREGABALIN 75 MG/1
75 CAPSULE ORAL 2 TIMES DAILY
Status: DISCONTINUED | OUTPATIENT
Start: 2021-09-15 | End: 2021-09-16 | Stop reason: HOSPADM

## 2021-09-15 RX ORDER — SODIUM CHLORIDE 9 MG/ML
INJECTION, SOLUTION INTRAVENOUS CONTINUOUS PRN
Status: DISCONTINUED | OUTPATIENT
Start: 2021-09-15 | End: 2021-09-15 | Stop reason: SDUPTHER

## 2021-09-15 RX ORDER — MORPHINE SULFATE 4 MG/ML
4 INJECTION, SOLUTION INTRAMUSCULAR; INTRAVENOUS
Status: DISCONTINUED | OUTPATIENT
Start: 2021-09-15 | End: 2021-09-16 | Stop reason: HOSPADM

## 2021-09-15 RX ORDER — SODIUM CHLORIDE 0.9 % (FLUSH) 0.9 %
10 SYRINGE (ML) INJECTION EVERY 12 HOURS SCHEDULED
Status: DISCONTINUED | OUTPATIENT
Start: 2021-09-15 | End: 2021-09-15 | Stop reason: HOSPADM

## 2021-09-15 RX ORDER — ROCURONIUM BROMIDE 10 MG/ML
INJECTION, SOLUTION INTRAVENOUS PRN
Status: DISCONTINUED | OUTPATIENT
Start: 2021-09-15 | End: 2021-09-15 | Stop reason: SDUPTHER

## 2021-09-15 RX ORDER — LOSARTAN POTASSIUM AND HYDROCHLOROTHIAZIDE 12.5; 5 MG/1; MG/1
2 TABLET ORAL DAILY
Status: DISCONTINUED | OUTPATIENT
Start: 2021-09-16 | End: 2021-09-16 | Stop reason: HOSPADM

## 2021-09-15 RX ORDER — OXYCODONE HYDROCHLORIDE 5 MG/1
5 TABLET ORAL EVERY 4 HOURS PRN
Status: DISCONTINUED | OUTPATIENT
Start: 2021-09-15 | End: 2021-09-16 | Stop reason: HOSPADM

## 2021-09-15 RX ORDER — SUCCINYLCHOLINE/SOD CL,ISO/PF 200MG/10ML
SYRINGE (ML) INTRAVENOUS PRN
Status: DISCONTINUED | OUTPATIENT
Start: 2021-09-15 | End: 2021-09-15 | Stop reason: SDUPTHER

## 2021-09-15 RX ORDER — PROPOFOL 10 MG/ML
INJECTION, EMULSION INTRAVENOUS PRN
Status: DISCONTINUED | OUTPATIENT
Start: 2021-09-15 | End: 2021-09-15 | Stop reason: SDUPTHER

## 2021-09-15 RX ORDER — MELOXICAM 7.5 MG/1
7.5 TABLET ORAL DAILY
Status: DISCONTINUED | OUTPATIENT
Start: 2021-09-16 | End: 2021-09-16 | Stop reason: HOSPADM

## 2021-09-15 RX ORDER — PREGABALIN 75 MG/1
75 CAPSULE ORAL 2 TIMES DAILY
Qty: 28 CAPSULE | Refills: 0 | Status: SHIPPED | OUTPATIENT
Start: 2021-09-15 | End: 2021-09-29

## 2021-09-15 RX ORDER — KETAMINE HCL IN NACL, ISO-OSM 100MG/10ML
SYRINGE (ML) INJECTION PRN
Status: DISCONTINUED | OUTPATIENT
Start: 2021-09-15 | End: 2021-09-15 | Stop reason: SDUPTHER

## 2021-09-15 RX ORDER — INSULIN GLARGINE 100 [IU]/ML
0.25 INJECTION, SOLUTION SUBCUTANEOUS NIGHTLY
Status: DISCONTINUED | OUTPATIENT
Start: 2021-09-15 | End: 2021-09-16

## 2021-09-15 RX ORDER — SODIUM CHLORIDE 9 MG/ML
25 INJECTION, SOLUTION INTRAVENOUS PRN
Status: DISCONTINUED | OUTPATIENT
Start: 2021-09-15 | End: 2021-09-15 | Stop reason: HOSPADM

## 2021-09-15 RX ADMIN — ROCURONIUM BROMIDE 50 MG: 10 INJECTION INTRAVENOUS at 07:47

## 2021-09-15 RX ADMIN — OXYCODONE HYDROCHLORIDE 10 MG: 10 TABLET ORAL at 06:58

## 2021-09-15 RX ADMIN — PREGABALIN 75 MG: 75 CAPSULE ORAL at 20:53

## 2021-09-15 RX ADMIN — CELECOXIB 200 MG: 200 CAPSULE ORAL at 06:58

## 2021-09-15 RX ADMIN — SUGAMMADEX 100 MG: 100 INJECTION, SOLUTION INTRAVENOUS at 08:49

## 2021-09-15 RX ADMIN — GLYCOPYRROLATE 0.1 MG: 0.2 INJECTION, SOLUTION INTRAMUSCULAR; INTRAVENOUS at 07:27

## 2021-09-15 RX ADMIN — CEFAZOLIN 2 G: 10 INJECTION, POWDER, FOR SOLUTION INTRAVENOUS at 07:27

## 2021-09-15 RX ADMIN — SODIUM CHLORIDE: 4.5 INJECTION, SOLUTION INTRAVENOUS at 14:42

## 2021-09-15 RX ADMIN — SUGAMMADEX 100 MG: 100 INJECTION, SOLUTION INTRAVENOUS at 08:54

## 2021-09-15 RX ADMIN — SODIUM CHLORIDE: 9 INJECTION, SOLUTION INTRAVENOUS at 08:54

## 2021-09-15 RX ADMIN — SENNOSIDES AND DOCUSATE SODIUM 1 TABLET: 50; 8.6 TABLET ORAL at 14:40

## 2021-09-15 RX ADMIN — ONDANSETRON 4 MG: 2 INJECTION INTRAMUSCULAR; INTRAVENOUS at 08:54

## 2021-09-15 RX ADMIN — SODIUM CHLORIDE: 9 INJECTION, SOLUTION INTRAVENOUS at 07:27

## 2021-09-15 RX ADMIN — SODIUM CHLORIDE: 9 INJECTION, SOLUTION INTRAVENOUS at 06:51

## 2021-09-15 RX ADMIN — Medication 140 MG: at 07:32

## 2021-09-15 RX ADMIN — ACETAMINOPHEN 650 MG: 325 TABLET ORAL at 20:52

## 2021-09-15 RX ADMIN — HYDROMORPHONE HYDROCHLORIDE 0.5 MG: 1 INJECTION, SOLUTION INTRAMUSCULAR; INTRAVENOUS; SUBCUTANEOUS at 09:00

## 2021-09-15 RX ADMIN — ASPIRIN 81 MG: 81 TABLET, COATED ORAL at 22:26

## 2021-09-15 RX ADMIN — Medication 30 MG: at 07:47

## 2021-09-15 RX ADMIN — LIDOCAINE HYDROCHLORIDE 100 MG: 20 INJECTION, SOLUTION EPIDURAL; INFILTRATION; INTRACAUDAL; PERINEURAL at 07:32

## 2021-09-15 RX ADMIN — OXYCODONE HYDROCHLORIDE 10 MG: 10 TABLET ORAL at 22:29

## 2021-09-15 RX ADMIN — FENTANYL CITRATE 50 MCG: 50 INJECTION INTRAMUSCULAR; INTRAVENOUS at 07:32

## 2021-09-15 RX ADMIN — CEFAZOLIN 2000 MG: 10 INJECTION, POWDER, FOR SOLUTION INTRAVENOUS at 14:45

## 2021-09-15 RX ADMIN — MIDAZOLAM 1.5 MG: 1 INJECTION INTRAMUSCULAR; INTRAVENOUS at 07:27

## 2021-09-15 RX ADMIN — CEFAZOLIN 2000 MG: 10 INJECTION, POWDER, FOR SOLUTION INTRAVENOUS at 22:26

## 2021-09-15 RX ADMIN — KETOROLAC TROMETHAMINE 30 MG: 30 INJECTION, SOLUTION INTRAMUSCULAR at 08:54

## 2021-09-15 RX ADMIN — MIDAZOLAM 0.5 MG: 1 INJECTION INTRAMUSCULAR; INTRAVENOUS at 07:32

## 2021-09-15 RX ADMIN — FENTANYL CITRATE 50 MCG: 50 INJECTION INTRAMUSCULAR; INTRAVENOUS at 07:27

## 2021-09-15 RX ADMIN — ACETAMINOPHEN 650 MG: 325 TABLET ORAL at 14:40

## 2021-09-15 RX ADMIN — DEXAMETHASONE SODIUM PHOSPHATE 8 MG: 4 INJECTION, SOLUTION INTRAMUSCULAR; INTRAVENOUS at 07:41

## 2021-09-15 RX ADMIN — PROPOFOL 200 MG: 10 INJECTION, EMULSION INTRAVENOUS at 07:32

## 2021-09-15 RX ADMIN — SENNOSIDES AND DOCUSATE SODIUM 1 TABLET: 50; 8.6 TABLET ORAL at 20:53

## 2021-09-15 RX ADMIN — HYDROMORPHONE HYDROCHLORIDE 0.5 MG: 1 INJECTION, SOLUTION INTRAMUSCULAR; INTRAVENOUS; SUBCUTANEOUS at 08:09

## 2021-09-15 ASSESSMENT — PULMONARY FUNCTION TESTS
PIF_VALUE: 16
PIF_VALUE: 8
PIF_VALUE: 17
PIF_VALUE: 16
PIF_VALUE: 11
PIF_VALUE: 17
PIF_VALUE: 18
PIF_VALUE: 16
PIF_VALUE: 36
PIF_VALUE: 11
PIF_VALUE: 0
PIF_VALUE: 8
PIF_VALUE: 17
PIF_VALUE: 16
PIF_VALUE: 16
PIF_VALUE: 17
PIF_VALUE: 10
PIF_VALUE: 4
PIF_VALUE: 15
PIF_VALUE: 0
PIF_VALUE: 16
PIF_VALUE: 6
PIF_VALUE: 17
PIF_VALUE: 20
PIF_VALUE: 8
PIF_VALUE: 18
PIF_VALUE: 11
PIF_VALUE: 17
PIF_VALUE: 16
PIF_VALUE: 15
PIF_VALUE: 17
PIF_VALUE: 16
PIF_VALUE: 17
PIF_VALUE: 16
PIF_VALUE: 17
PIF_VALUE: 17
PIF_VALUE: 20
PIF_VALUE: 16
PIF_VALUE: 11
PIF_VALUE: 17
PIF_VALUE: 16
PIF_VALUE: 11
PIF_VALUE: 16
PIF_VALUE: 16
PIF_VALUE: 17
PIF_VALUE: 16
PIF_VALUE: 16
PIF_VALUE: 17
PIF_VALUE: 18
PIF_VALUE: 17
PIF_VALUE: 17
PIF_VALUE: 15
PIF_VALUE: 15
PIF_VALUE: 16
PIF_VALUE: 11
PIF_VALUE: 16
PIF_VALUE: 16
PIF_VALUE: 1
PIF_VALUE: 16
PIF_VALUE: 17
PIF_VALUE: 1
PIF_VALUE: 17
PIF_VALUE: 18
PIF_VALUE: 17
PIF_VALUE: 16
PIF_VALUE: 16
PIF_VALUE: 15
PIF_VALUE: 17
PIF_VALUE: 1
PIF_VALUE: 17
PIF_VALUE: 17
PIF_VALUE: 16
PIF_VALUE: 11
PIF_VALUE: 17
PIF_VALUE: 16
PIF_VALUE: 17
PIF_VALUE: 16
PIF_VALUE: 1
PIF_VALUE: 17
PIF_VALUE: 17
PIF_VALUE: 20
PIF_VALUE: 16
PIF_VALUE: 17
PIF_VALUE: 11
PIF_VALUE: 16
PIF_VALUE: 18

## 2021-09-15 ASSESSMENT — PAIN SCALES - GENERAL
PAINLEVEL_OUTOF10: 3
PAINLEVEL_OUTOF10: 4
PAINLEVEL_OUTOF10: 7
PAINLEVEL_OUTOF10: 7
PAINLEVEL_OUTOF10: 5

## 2021-09-15 ASSESSMENT — PAIN SCALES - WONG BAKER: WONGBAKER_NUMERICALRESPONSE: 0

## 2021-09-15 ASSESSMENT — PAIN - FUNCTIONAL ASSESSMENT
PAIN_FUNCTIONAL_ASSESSMENT: PREVENTS OR INTERFERES WITH ALL ACTIVE AND SOME PASSIVE ACTIVITIES
PAIN_FUNCTIONAL_ASSESSMENT: 0-10
PAIN_FUNCTIONAL_ASSESSMENT: PREVENTS OR INTERFERES SOME ACTIVE ACTIVITIES AND ADLS
PAIN_FUNCTIONAL_ASSESSMENT: PREVENTS OR INTERFERES SOME ACTIVE ACTIVITIES AND ADLS
PAIN_FUNCTIONAL_ASSESSMENT: PREVENTS OR INTERFERES WITH ALL ACTIVE AND SOME PASSIVE ACTIVITIES
PAIN_FUNCTIONAL_ASSESSMENT: PREVENTS OR INTERFERES SOME ACTIVE ACTIVITIES AND ADLS

## 2021-09-15 ASSESSMENT — PAIN DESCRIPTION - ORIENTATION
ORIENTATION: LEFT

## 2021-09-15 ASSESSMENT — PAIN DESCRIPTION - PAIN TYPE
TYPE: SURGICAL PAIN

## 2021-09-15 ASSESSMENT — PAIN DESCRIPTION - ONSET
ONSET: ON-GOING

## 2021-09-15 ASSESSMENT — PAIN DESCRIPTION - DESCRIPTORS
DESCRIPTORS: DISCOMFORT
DESCRIPTORS: SORE
DESCRIPTORS: DISCOMFORT
DESCRIPTORS: SORE
DESCRIPTORS: DISCOMFORT

## 2021-09-15 ASSESSMENT — PAIN DESCRIPTION - FREQUENCY
FREQUENCY: CONTINUOUS
FREQUENCY: INTERMITTENT
FREQUENCY: CONTINUOUS

## 2021-09-15 ASSESSMENT — PAIN DESCRIPTION - LOCATION
LOCATION: HIP

## 2021-09-15 ASSESSMENT — PAIN DESCRIPTION - PROGRESSION
CLINICAL_PROGRESSION: NOT CHANGED
CLINICAL_PROGRESSION: NOT CHANGED
CLINICAL_PROGRESSION: GRADUALLY IMPROVING
CLINICAL_PROGRESSION: GRADUALLY WORSENING
CLINICAL_PROGRESSION: GRADUALLY WORSENING

## 2021-09-15 ASSESSMENT — LIFESTYLE VARIABLES: SMOKING_STATUS: 0

## 2021-09-15 NOTE — PROGRESS NOTES
Physical Therapy    Facility/Department: [facility] 3W ORTHOPEDICS  Initial Assessment    NAME: Jose Ryder  : 1965  MRN: [id number]    Date of Service: 9/15/2021    Assessment / Discharge Recommendations:  -good start with initial efforts OOB  -anticipate discharge to home tomorrow with family assist and Home PT  -has walker for home use  -instructed in hip precautions and placed poster to room (will review and reinforce tomorrow)    Body structures, Functions, Activity limitations: Decreased functional mobility ; Decreased ADL status; Decreased strength  Prognosis: Good  Decision Making: Medium Complexity  REQUIRES PT FOLLOW UP: Yes  Activity Tolerance  Activity Tolerance: Patient Tolerated treatment well       Patient Diagnosis(es): The primary encounter diagnosis was History of right hip replacement. A diagnosis of Arthritis of left hip was also pertinent to this visit. has a past medical history of Arthritis, High blood pressure, [place] (hard of hearing), Hyperlipidemia, Skin cancer of scalp, Sleep apnea, Uses hearing aid, and Wears glasses. has a past surgical history that includes shoulder surgery (Right); eye surgery (Bilateral); joint replacement (Right, 10/19/2016); skin biopsy; and Total hip arthroplasty (Left, 9/15/2021). Restrictions  Restrictions/Precautions  Restrictions/Precautions: Weight Bearing, Fall Risk, ROM Restrictions  Lower Extremity Weight Bearing Restrictions  Left Lower Extremity Weight Bearing: Weight Bearing As Tolerated  Position Activity Restriction  Hip Precautions: No hip external rotation  Other position/activity restrictions: No straight leg raises.  No hip ER past 60 deg and no hip hyperextension  Vision/Hearing  Vision: Impaired  Vision Exceptions: Wears glasses at all times  Hearing: Exceptions to [facility]  Hearing Exceptions: Bilateral hearing aid     Subjective  General  Chart Reviewed: Yes  Patient assessed for rehabilitation services?: Yes  Additional Pertinent Hx: here for elective left THR   -had right THR in 2016  Response To Previous Treatment: Not applicable  Family / Caregiver Present: Yes  Follows Commands: Within Functional Limits  Subjective  Subjective: arrived to room along with OT to patient resting in bed - alert oriented and agreeable to PTOT assessments and OOB - states pain is minimal  Orientation  Orientation  Overall Orientation Status: Within Functional Limits  Social/Functional History  Social/Functional History  Lives With: Spouse  Type of Home: House  Home Layout: Two level  Home Access: Stairs to enter without rails  Entrance Stairs - Number of Steps: 2  Bathroom Shower/Tub: Tub/Shower unit, Walk-in shower  Bathroom Toilet: Handicap height  Bathroom Accessibility: Accessible  Home Equipment: Standard walker, Rolling walker, 4 wheeled walker, Cane, Crutches, [address], Sock aid  ADL Assistance: Independent  Ambulation Assistance: Independent  Transfer Assistance: Independent  Active : Yes  Objective  ROM and strength non-surgical limbs grossly wfl  Motor Control  Gross Motor?: WFL  Sensation  Overall Sensation Status: WFL  Bed mobility  Supine to Sit: Stand by assistance  Sit to Supine: Unable to assess  Scooting: Stand by assistance  Transfers  Sit to Stand: Contact guard assistance;Stand by assistance  Stand to sit: Contact guard assistance;Stand by assistance  Ambulation  Ambulation?: Yes  Ambulation 1  Surface: level tile  Device: Rolling Walker  Assistance: Contact guard assistance;Stand by assistance  Quality of Gait: step to progressing to step through pattern with good heel contact and good weight bearing - adequate base of support  Distance: in room for ~30 feet  Comments: steady on the walker  Stairs/Curb  Stairs?: No  Balance  Comments: midline in sitting at the EOB and in static stance on the walker   -dynamic is good on rolling walker in this first but limited observation  Exercises  Ankle Pumps: 30 per hour in easy pace  Comments: encouraged practice with the spirometer as instructed by RT   Plan   Plan  Times per week: 1-4 sessions  Current Treatment Recommendations: Strengthening, Transfer Training, Patient/Caregiver Education & Training, ADL/Self-care Training, Modalities, Gait Training, Stair training, Positioning  Safety Devices  Type of devices:  All fall risk precautions in place, Call light within reach, Left in bed, Nurse notified Alaina Marte)    Goals  Short term goals  Time Frame for Short term goals: 1-2 days  Short term goal 1: bed mobility at [address] term goal 2: transfer at supervision  Short term goal 3: ambulation at supervision wbat rolling walker for household distances    -steps as needed for home entry at [address] [facility] [address] rail  Short term goal 4: exercises at supervision  Patient Goals   Patient goals : relief of chronic hip pain and good walking       Therapy Time   Individual Concurrent Group Co-treatment   Time In 1400         Time Out 1430         Minutes Tigre Briseno PT

## 2021-09-15 NOTE — ANESTHESIA POSTPROCEDURE EVALUATION
Department of Anesthesiology  Postprocedure Note    Patient: Jose Barajas  MRN: [id number]  YOB: 1965  Date of evaluation: 9/15/2021  Time:  3:50 PM     Procedure Summary     Date: 09/15/21 Room / Location:  [facility] [address] [place] [address]    Anesthesia Start: 6511 Anesthesia Stop: 9008    Procedure: LEFT ANTERIOR TOTAL HIP REPLACEMENT WITH C-ARM (Left ) Diagnosis:       Arthritis of left hip      (LEFT HIP ARTHRITIS)    Surgeons: Giovanna Ruiz MD Responsible Provider: Philipp Mejia MD    Anesthesia Type: general ASA Status: 2          Anesthesia Type: general    Erickson Phase I: Erickson Score: 8    Erickson Phase II:      Last vitals: Reviewed and per EMR flowsheets. Anesthesia Post Evaluation    Patient location during evaluation: PACU  Patient participation: complete - patient participated  Level of consciousness: sleepy but conscious  Airway patency: patent  Nausea & Vomiting: no nausea and no vomiting  Complications: no  Cardiovascular status: hemodynamically stable and blood pressure returned to baseline  Respiratory status: spontaneous ventilation, nonlabored ventilation and nasal cannula  Hydration status: stable  Comments: Mr. Nury Lopez resting comfortably upon arrival to PACU. No nausea reported.       Philipp Mejia MD

## 2021-09-15 NOTE — PROGRESS NOTES
Pt sitting up in chair after working with therapy. He has had no further c/o pain and has been ambulating in room with walker and standby assist, tolerating well. Fall precautions in place, belongings within reach. Will continue to monitor and assess.

## 2021-09-15 NOTE — CARE COORDINATION
AerPranaye rep opened chart in error. Meant to open the chart of the patient next door. AerPranaye signing out.

## 2021-09-15 NOTE — OP NOTE
Patient: Artem Dick  YOB: 1965  MRN: [id number]    Date of Procedure: 9/15/2021      Pre-Op Diagnosis: Osteoarthritis, left hip     Post-Op Diagnosis: Same       Procedure Performed: Left anterior total hip arthroplasty     Surgeon: Yunior Luz MD     Physician Assistant: JESUS ALBERTO Coffey     Anesthesia: General     Estimated Blood Loss: 923 mL      Complications: None     Specimens: Femoral head    Implants:  Depuy [place] Gription cup, 58 mm  40 mm polyethylene liner, +4 mm offset  Depuy Actis stem, size 5 high offset  40 mm diameter ceramic femoral head, +5 mm offset    Indications: This is a 64 y.o. male with osteoarthritis of the hip that continues to be painful despite conservative management. We discussed the diagnosis and treatment options and I recommended total hip arthroplasty. The operative procedure, alternatives, and risks were discussed in detail with the patient. The risks include but are not limited to: Infection, vessel injury, nerve injury, DVT, pulmonary embolism, implant loosening, need for revision surgery, leg length discrepancy, dislocation, lateral femoral cutaneous nerve palsy, intraoperative fracture. Informed consent for surgery was signed by the patient. Details: The patient was seen in the preoperative holding area where the site of surgery was marked and informed consent was confirmed. The patient was brought back to the operating room by OR personnel. General anesthesia was administered. The patient was positioned supine on the [place] table. The left lower extremity was then prepped and draped in a standard and sterile fashion. A final and formal timeout was then performed which confirmed the correct patient, correct position, and correct site of surgery. IV antibiotics were administered within 1 hour of the skin incision. A direct anterior supine approach was utilized.  The skin and subcutaneous tissue was dissected down to the body of the tensor fascia ellen. Incision into the fascia of the TFL was made and dissection was carried medial to the tensor and lateral to the rectus femoris and sartorius. The anterior femoral circumflex vessels were identified and coagulated. The anterior capsule of the hip was exposed and an anterior capsulotomy was made. The femoral neck was exposed and protected by two cobra retractors. A proximal femoral osteotomy was performed and the femoral head was removed. The acetabulum was exposed and debrided of labral and capsular structures. Osteophytes and other acetabular debris were removed. The acetabulum was reamed under direct fluroscopic evaluation and then an uncemented 58 mm cup was hammered into place in the appropriate abduction and anteversion. This was checked with the C arm and was found to be in satisfacfory position. There were no screws used to secure the fixation of the implant to the floor of the acetabulum. A +4.0 mm 40 mm trial liner was placed. The proximal femur was exposed by using the hook from the [place] table, externally rotating, extending, and adducting the leg. The proximal femur was prepared to accept a 5 high offset trial stem. The hip was reduced after placing the +1.5 mm 40 mm head segment over the Melvenia Palm taper of the trial implant. The C arm was brought in to confirm satisfactory hardware placement and leg length equality. The hip was dislocated and the trial implants were removed. The +4.0 mm 40 mm liner was placed. Again using the hook from the [place] table and extending, externally rotating, and adducting the hip, the number 5 high offset Actis stem was hammered into place in the appropriate anteversion. To better match leg lengths, the +5 mm 40 mm ceramic head segment was placed over the Melvenia Palm taper. The hip was reduced. The C arm was brought in and confirmed satisfactory postion of the implants and leg length equity. The wound was irrigated and hemostasis was obtained.  The wound was injected with local anesthetic. The wound was bathed with betadine. The wound was closed in layers, a dressing was applied, and the patient was brought to recovery in satisfactory condition. JESUS ALBERTO Solano was essential in patient positioning, surgical assistance during the arthroplasty, and in wound closure.     Skyler Joseph MD  9/15/2021

## 2021-09-15 NOTE — PLAN OF CARE
Problem: Falls - Risk of:  Goal: Will remain free from falls  Description: Will remain free from falls  Outcome: Ongoing  Goal: Absence of physical injury  Description: Absence of physical injury  Outcome: Ongoing     Problem: Discharge Planning:  Goal: Knowledge of discharge instructions  Description: Knowledge of discharge instructions  Outcome: Ongoing     Problem: Infection - Surgical Site:  Goal: Signs of wound healing will improve  Description: Signs of wound healing will improve  Outcome: Ongoing  Note: YOLY incision as bandage in place. He has been afebrile since arriving to unit. Will monitor for changes. Problem: Mobility - Impaired:  Goal: Achieve maximum mobility level  Description: Achieve maximum mobility level  Outcome: Ongoing  Note: Pt able to ambulate with walker and standby assist, tolerating well. Problem: Pain - Acute:  Goal: Pain level will decrease  Description: Pain level will decrease  Outcome: Ongoing  Note: Pt reports L hip pain rated 4/10. Scheduled Tylenol administered. Will monitor for effectiveness. Problem: Cardiac:  Goal: Ability to maintain vital signs within normal range will improve  Description: Ability to maintain vital signs within normal range will improve  Outcome: Ongoing  Note: Pt's BP has been WDL since arriving to unit. Will monitor for changes.

## 2021-09-15 NOTE — PROGRESS NOTES
Met with patient and family at bedside, patient is alert and oriented x4, up in chair after working with therapy. discussed role of nurse navigator. Reviewed reasons to call with questions or concerns, importance of TEDS, Incentive spirometer, pain medication, and physical and occupational therapy. 2/4 bed rails up, bed in lowest position, fall precautions in place, call light within reach. Pulses present bilaterally +2 pedal, no drainage or odor noted at surgical dressing left hip. dry Dressing clean, dry, and intact. Ice in place. Nba and scds on BLEs. Neurovascular checks performed and WNLs, patient denies numbness or tingling. DC Plan: home with wife scott and [facility]  . scott to transport patient. DME needs:denies, already has a rolling walker.     Jerrod Hobbs  Orthopedic Nurse Navigator  Phone number: [phone]    Future Appointments   Date Time Provider Donald Nassar   9/30/2021 11:00 AM MD Neris Thacker     Electronically signed by Austin Blackwood RN on 9/15/2021 at 3:20 PM

## 2021-09-15 NOTE — PROGRESS NOTES
Educated patient on purpose of 4 eyes skin assessment and asked patient if allowed to perform, patient verbalized understanding and agreed to assessment. 4 Eyes Skin Assessment     The patient is being assess for  Post-Op Surgical    I agree that 2 RN's have performed a thorough Head to Toe Skin Assessment on the patient. ALL assessment sites listed below have been assessed.        Areas assessed by both nurses: jef and precious  [x]   Head, Face, and Ears   [x]   Shoulders, Back, and Chest  [x]   Arms, Elbows, and Hands   [x]   Coccyx, Sacrum, and IschIum  [x]   Legs, Feet, and Heels        Does the Patient have Skin Breakdown?  surgical left hip          Shubham Prevention initiated:  NA   Wound Care Orders initiated:  NA      WOC nurse consulted for Pressure Injury (Stage 3,4, Unstageable, DTI, NWPT, and Complex wounds), New and Established Ostomies:  NA      Nurse 1 eSignature: Electronically signed by Vinh Dunn RN on 9/15/21 at 3:01 PM EDT    **SHARE this note so that the co-signing nurse is able to place an eSignature**    Nurse 2 eSignature: Electronically signed by Grant Wilson RN on 9/15/21 at 3:06 PM EDT

## 2021-09-15 NOTE — PROGRESS NOTES
Occupational Therapy   Occupational Therapy Initial Assessment  Date: 9/15/2021   Patient Name: Daisy Dodson  MRN: [id number]     : 1965    Date of Service: 9/15/2021    Discharge Recommendations:  2-3 sessions per week, Patient would benefit from continued therapy after discharge, Home with Home health OT, 24 hour supervision or assist  OT Equipment Recommendations  Equipment Needed: No    Daisy Dodson scored a 18/24 on the AM-PAC ADL Inpatient form. Current research shows that an AM-PAC score of 18 or greater is typically associated with a discharge to the patient's home setting. Based on the patient's AM-PAC score, and their current ADL deficits, it is recommended that the patient have 2-3 sessions per week of Occupational Therapy at d/c to increase the patient's independence. At this time, this patient demonstrates the endurance and safety to discharge home with [address] [place] [address] (home vs OP services) and a follow up treatment frequency of 2-3x/wk. Please see assessment section for further patient specific details. If patient discharges prior to next session this note will serve as a discharge summary. Please see below for the latest assessment towards goals. Assessment   Performance deficits / Impairments: Decreased functional mobility ; Decreased balance;Decreased safe awareness;Decreased ADL status; Decreased ROM; Decreased high-level IADLs;Decreased endurance;Decreased strength  Assessment: 65 yo male admitted 9/15 for elective L JOON- anterior. PMH: Arthritis, HTN, [place], 2016 R JOON. PTA, pt lives with spouse and independent with ADL, IADL, and fxl mobility no AD. Today, pt functioning below baseline d/t above deficits. Pt required SBA bed mobility, CGA for tx, fxl mobility with RW, toileting, and sink side hand washing, and Mod A LB dressing (donning pant). Pt educated on ant hip prec and transfers.  Cont acute OT and rec d.c home with 24 hr supervision/assist and OT 2-3x/week  Treatment Diagnosis: impaired ADL/fxl mobility  Prognosis: Good  Decision Making: Low Complexity  OT Education: OT Role;Plan of Care;Transfer Training;ADL Adaptive Strategies;Precautions  Patient Education: ant hip prec  REQUIRES OT FOLLOW UP: Yes  Activity Tolerance  Activity Tolerance: Patient Tolerated treatment well  Safety Devices  Safety Devices in place: Yes  Type of devices: Left in chair;Patient at risk for falls;Gait belt;Call light within reach; Chair alarm in place;Nurse notified         Patient Diagnosis(es): The primary encounter diagnosis was History of right hip replacement. A diagnosis of Arthritis of left hip was also pertinent to this visit. has a past medical history of Arthritis, High blood pressure, [place] (hard of hearing), Hyperlipidemia, Skin cancer of scalp, Sleep apnea, Uses hearing aid, and Wears glasses. has a past surgical history that includes shoulder surgery (Right); eye surgery (Bilateral); joint replacement (Right, 10/19/2016); skin biopsy; and Total hip arthroplasty (Left, 9/15/2021). Treatment Diagnosis: impaired ADL/fxl mobility      Restrictions  Restrictions/Precautions  Restrictions/Precautions: Weight Bearing, Fall Risk, ROM Restrictions  Lower Extremity Weight Bearing Restrictions  Left Lower Extremity Weight Bearing: Weight Bearing As Tolerated  Position Activity Restriction  Hip Precautions: No hip external rotation  Other position/activity restrictions: No straight leg raises. No hip ER past 60 deg and no hip hyperextension    Subjective   General  Chart Reviewed: Yes  Patient assessed for rehabilitation services?: Yes  Additional Pertinent Hx: 65 yo male admitted 9/15 for elective L JOON- anterior.  PMH: Arthritis, HTN, [place], 2016 R JOON  Family / Caregiver Present: Yes (\"Esther\" spouse)  Referring Practitioner: Mati Quiñones MD  Diagnosis: R JOON  Subjective  Subjective: Pt resting in bed upon arrival and agreeable to OT/PT eval. Pt reporting no pain  General Comment  Comments: RN ok to see  Pain Assessment  Pain Assessment: Respiratory Rate >10  Vital Signs  Temp: 98.5 °F (36.9 °C)  Temp Source: Oral  Pulse: 79  Heart Rate Source: Monitor  Resp: 16  BP: 124/77  BP Location: Left Arm  MAP (mmHg): 92  Level of Consciousness: Alert (0)  MEWS Score: 1  Oxygen Therapy  SpO2: 95 %  O2 Device: None (Room air)    Social/Functional History  Social/Functional History  Lives With: Spouse  Type of Home: House  Home Layout: Two level  Home Access: Stairs to enter without rails  Entrance Stairs - Number of Steps: 2  Bathroom Shower/Tub: Tub/Shower unit, Walk-in shower  Bathroom Toilet: Handicap height  Bathroom Accessibility: Accessible  Home Equipment: Standard walker, Rolling walker, 4 wheeled walker, Cane, Crutches, Reacher, Sock aid  ADL Assistance: Independent  Ambulation Assistance: Independent  Transfer Assistance: Independent  Active : Yes       Objective   Vision: Impaired  Vision Exceptions: Wears glasses at all times  Hearing: Exceptions to [facility]  Hearing Exceptions: Bilateral hearing aid    Orientation  Overall Orientation Status: Within Normal Limits     Balance  Sitting Balance: Stand by assistance  Standing Balance: Contact guard assistance (~2 min standing at toilet to urinate)  Functional Mobility  Functional - Mobility Device: Rolling Walker  Activity:  (EOB>bathroom>recliner)  Assist Level: Contact guard assistance  Functional Mobility Comments: able to off weight LLE well with BUE on RW to prevent pain. Min unsteadiness, no LOB  Toilet Transfers  Toilet Transfers Comments: CGA standing to urinate  ADL  Grooming: Contact guard assistance (sink side hand washing)  LE Dressing:  Moderate assistance (donning underpants- assist to thread, pulls over hip with CGA)  Toileting: Contact guard assistance (standing at toilet to urinate)  Tone RUE  RUE Tone: Normotonic  Tone LUE  LUE Tone: Normotonic  Coordination  Movements Are Fluid And Coordinated: Yes     Bed mobility  Supine to Sit: Stand by assistance  Sit to Supine: Unable to assess  Scooting: Stand by assistance  Comment: HOB elevated  Transfers  Sit to stand: Contact guard assistance  Stand to sit: Contact guard assistance  Transfer Comments: RW. cues for hand placement     Cognition  Overall Cognitive Status: WNL        Sensation  Overall Sensation Status: WFL        LUE AROM (degrees)  LUE AROM : WFL  RUE AROM (degrees)  RUE AROM : WFL  LUE Strength  Gross LUE Strength: WFL  RUE Strength  Gross RUE Strength: WFL                   Plan   Plan  Times per week: 1-2 follow up  Times per day: Daily  Current Treatment Recommendations: Strengthening, Endurance Training, Patient/Caregiver Education & Training, ROM, Self-Care / ADL, Balance Training, Pain Management, Functional Mobility Training, Safety Education & Training, Positioning    AM-PAC Score        AM-[facility] Inpatient Daily Activity Raw Score: 18 (09/15/21 1438)  AM-PAC Inpatient ADL T-Scale Score : 38.66 (09/15/21 1438)  ADL Inpatient CMS 0-100% Score: 46.65 (09/15/21 1438)  ADL Inpatient CMS G-Code Modifier : CK (09/15/21 1438)    Goals  Short term goals  Time Frame for Short term goals: prior to d/c  Short term goal 1: UB dressing set up  Short term goal 2: LB dressing with AE- pants SBA, geri hose Mod A  Short term goal 3: tolerate 5 min fxl standing task SUP  Short term goal 4: toileting SUP  Short term goal 5: verbalize 3/3 ant hip precautions  Patient Goals   Patient goals : get home       Therapy Time   Individual Concurrent Group Co-treatment   Time In 1400         Time Out 1430         Minutes 30         Timed Code Treatment Minutes: 15 Minutes (15 eval. 15 ADL)       ANNIKA Cevallos, OTR/L

## 2021-09-15 NOTE — PROGRESS NOTES
Pt arrived to room 3119 from PACU. Vital signs taken and were WNL. Assessment completed. Pt oriented to room, bed, phone, and call light. Fall precautions in place. Call light, bedside table and phone within easy reach. Pt instructed to use call light to call for assistance.

## 2021-09-15 NOTE — PROGRESS NOTES
[facility] Orthopedic Surgery   Progress Note      S/P :  SUBJECTIVE  Alert and oriented. Wife at bedside. Up an about in room with PT OT. Pain is   described in left hip and with the intensity of moderate. Pain is described as aching. OBJECTIVE              Physical                      VITALS:  /77   Pulse 79   Temp 98.5 °F (36.9 °C) (Oral)   Resp 16   Ht 6' 1\" (1.854 m)   Wt 230 lb (104.3 kg)   SpO2 95%   BMI 30.34 kg/m²                     MUSCULOSKELETAL:  left foot NVI. Wiggles toes to command. Pedal pulses are palpable.                    NEUROLOGIC:                                  Sensory:  Touch:  Left Lower Extremity:  normal                                                 Surgical wound appears clean and dry left hip with guaze and tape dressing Ice pack on CATRACHITO hose on    Data       CBC:   Lab Results   Component Value Date    WBC 7.4 08/30/2021    RBC 5.07 08/30/2021    HGB 15.4 08/30/2021    HCT 44.0 08/30/2021    MCV 86.8 08/30/2021    MCH 30.4 08/30/2021    MCHC 35.0 08/30/2021    RDW 12.9 08/30/2021     08/30/2021    MPV 7.3 08/30/2021        WBC:    Lab Results   Component Value Date    WBC 7.4 08/30/2021        Hemoglobin/Hematocrit:    Lab Results   Component Value Date    HGB 15.4 08/30/2021    HCT 44.0 08/30/2021        PT/INR:    Lab Results   Component Value Date    PROTIME 9.8 08/30/2021    INR 0.87 08/30/2021              Current Inpatient Medications             Current Facility-Administered Medications: HYDROmorphone (DILAUDID) injection 0.5 mg, 0.5 mg, IntraVENous, Q5 Min PRN  ondansetron (ZOFRAN) injection 4 mg, 4 mg, IntraVENous, Once PRN  HYDROmorphone (DILAUDID) injection 0.25 mg, 0.25 mg, IntraVENous, Q5 Min PRN  labetalol (NORMODYNE;TRANDATE) injection 5 mg, 5 mg, IntraVENous, Q10 Min PRN  hydrALAZINE (APRESOLINE) injection 5 mg, 5 mg, IntraVENous, Q10 Min PRN  diphenhydrAMINE (BENADRYL) injection 12.5 mg, 12.5 mg, IntraVENous, Once PRN  [START ON 9/16/2021] losartan-hydroCHLOROthiazide (HYZAAR) 50-12.5 MG per tablet 2 tablet, 2 tablet, Oral, Daily  insulin glargine (LANTUS) injection vial 26 Units, 0.25 Units/kg, SubCUTAneous, Nightly  insulin lispro (HUMALOG) injection vial 8 Units, 0.08 Units/kg, SubCUTAneous, TID WC  insulin lispro (HUMALOG) injection vial 0-6 Units, 0-6 Units, SubCUTAneous, TID WC  insulin lispro (HUMALOG) injection vial 0-3 Units, 0-3 Units, SubCUTAneous, Nightly  glucose (GLUTOSE) 40 % oral gel 15 g, 15 g, Oral, PRN  dextrose 50 % IV solution, 12.5 g, IntraVENous, PRN  glucagon (rDNA) injection 1 mg, 1 mg, IntraMUSCular, PRN  dextrose 5 % solution, 100 mL/hr, IntraVENous, PRN  0.45 % sodium chloride infusion, , IntraVENous, Continuous  sodium chloride flush 0.9 % injection 10 mL, 10 mL, IntraVENous, 2 times per day  sodium chloride flush 0.9 % injection 10 mL, 10 mL, IntraVENous, PRN  0.9 % sodium chloride infusion, 25 mL, IntraVENous, PRN  ceFAZolin (ANCEF) 2000 mg in dextrose 5 % 100 mL IVPB, 2,000 mg, IntraVENous, Q8H  acetaminophen (TYLENOL) tablet 650 mg, 650 mg, Oral, Q6H  oxyCODONE (ROXICODONE) immediate release tablet 5 mg, 5 mg, Oral, Q4H PRN **OR** oxyCODONE HCl (OXY-IR) immediate release tablet 10 mg, 10 mg, Oral, Q4H PRN  morphine (PF) injection 2 mg, 2 mg, IntraVENous, Q3H PRN **OR** morphine (PF) injection 4 mg, 4 mg, IntraVENous, Q3H PRN  ondansetron (ZOFRAN-ODT) disintegrating tablet 4 mg, 4 mg, Oral, Q8H PRN **OR** ondansetron (ZOFRAN) injection 4 mg, 4 mg, IntraVENous, Q6H PRN  sennosides-docusate sodium (SENOKOT-S) 8.6-50 MG tablet 1 tablet, 1 tablet, Oral, BID  magnesium hydroxide (MILK OF MAGNESIA) 400 MG/5ML suspension 30 mL, 30 mL, Oral, Daily PRN  aspirin EC tablet 81 mg, 81 mg, Oral, BID  ketorolac (TORADOL) injection 15 mg, 15 mg, IntraVENous, PRN  [START ON 9/16/2021] meloxicam (MOBIC) tablet 7.5 mg, 7.5 mg, Oral, Daily  pregabalin (LYRICA) capsule 75 mg, 75 mg, Oral, BID    ASSESSMENT AND PLAN      Post left JOON, stable exam  DVT prophylaxis ordered, ASA 81mg twice at day for 30 days for DVT prophylaxis  PT OT for ADL's and ambulation as tolerated, anterior hip precautions  SS for DC planning, home with home care tomorrow  IV or PO pain med as ordered, Lyrica and Mobic added for postop pain control in an effort to reduce narcotic use per Dr Neal Kowalski protocol.      Herb Powell, LUCRECIA - CNP  9/15/2021  2:22 PM

## 2021-09-16 VITALS
SYSTOLIC BLOOD PRESSURE: 158 MMHG | DIASTOLIC BLOOD PRESSURE: 81 MMHG | WEIGHT: 229.06 LBS | HEIGHT: 73 IN | RESPIRATION RATE: 16 BRPM | HEART RATE: 85 BPM | TEMPERATURE: 98.4 F | OXYGEN SATURATION: 97 % | BODY MASS INDEX: 30.36 KG/M2

## 2021-09-16 LAB
ANION GAP SERPL CALCULATED.3IONS-SCNC: 10 MMOL/L (ref 3–16)
BUN BLDV-MCNC: 14 MG/DL (ref 7–20)
CALCIUM SERPL-MCNC: 8.8 MG/DL (ref 8.3–10.6)
CHLORIDE BLD-SCNC: 98 MMOL/L (ref 99–110)
CO2: 24 MMOL/L (ref 21–32)
CREAT SERPL-MCNC: 0.8 MG/DL (ref 0.9–1.3)
GFR AFRICAN AMERICAN: >60
GFR NON-AFRICAN AMERICAN: >60
GLUCOSE BLD-MCNC: 125 MG/DL (ref 70–99)
GLUCOSE BLD-MCNC: 125 MG/DL (ref 70–99)
PERFORMED ON: ABNORMAL
POTASSIUM SERPL-SCNC: 3.6 MMOL/L (ref 3.5–5.1)
SODIUM BLD-SCNC: 132 MMOL/L (ref 136–145)

## 2021-09-16 PROCEDURE — 97116 GAIT TRAINING THERAPY: CPT

## 2021-09-16 PROCEDURE — 2580000003 HC RX 258: Performed by: ORTHOPAEDIC SURGERY

## 2021-09-16 PROCEDURE — 6370000000 HC RX 637 (ALT 250 FOR IP): Performed by: ORTHOPAEDIC SURGERY

## 2021-09-16 PROCEDURE — G0378 HOSPITAL OBSERVATION PER HR: HCPCS

## 2021-09-16 PROCEDURE — 97535 SELF CARE MNGMENT TRAINING: CPT

## 2021-09-16 PROCEDURE — 6370000000 HC RX 637 (ALT 250 FOR IP): Performed by: NURSE PRACTITIONER

## 2021-09-16 PROCEDURE — 97110 THERAPEUTIC EXERCISES: CPT

## 2021-09-16 PROCEDURE — 80048 BASIC METABOLIC PNL TOTAL CA: CPT

## 2021-09-16 PROCEDURE — 36415 COLL VENOUS BLD VENIPUNCTURE: CPT

## 2021-09-16 RX ORDER — METOPROLOL SUCCINATE 50 MG/1
50 TABLET, EXTENDED RELEASE ORAL EVERY EVENING
COMMUNITY
Start: 2021-09-14

## 2021-09-16 RX ORDER — AMLODIPINE BESYLATE 10 MG/1
10 TABLET ORAL DAILY
Status: DISCONTINUED | OUTPATIENT
Start: 2021-09-16 | End: 2021-09-16 | Stop reason: HOSPADM

## 2021-09-16 RX ADMIN — Medication 10 ML: at 08:14

## 2021-09-16 RX ADMIN — PREGABALIN 75 MG: 75 CAPSULE ORAL at 08:10

## 2021-09-16 RX ADMIN — LOSARTAN POTASSIUM AND HYDROCHLOROTHIAZIDE 2 TABLET: 12.5; 5 TABLET ORAL at 08:10

## 2021-09-16 RX ADMIN — AMLODIPINE BESYLATE 10 MG: 10 TABLET ORAL at 09:31

## 2021-09-16 RX ADMIN — OXYCODONE HYDROCHLORIDE 10 MG: 10 TABLET ORAL at 09:22

## 2021-09-16 RX ADMIN — ACETAMINOPHEN 650 MG: 325 TABLET ORAL at 04:50

## 2021-09-16 RX ADMIN — MELOXICAM 7.5 MG: 7.5 TABLET ORAL at 08:10

## 2021-09-16 RX ADMIN — SENNOSIDES AND DOCUSATE SODIUM 1 TABLET: 50; 8.6 TABLET ORAL at 08:10

## 2021-09-16 RX ADMIN — ACETAMINOPHEN 650 MG: 325 TABLET ORAL at 08:11

## 2021-09-16 RX ADMIN — OXYCODONE HYDROCHLORIDE 10 MG: 10 TABLET ORAL at 04:52

## 2021-09-16 RX ADMIN — ASPIRIN 81 MG: 81 TABLET, COATED ORAL at 08:11

## 2021-09-16 ASSESSMENT — PAIN DESCRIPTION - PAIN TYPE
TYPE: SURGICAL PAIN

## 2021-09-16 ASSESSMENT — PAIN DESCRIPTION - LOCATION
LOCATION: HIP

## 2021-09-16 ASSESSMENT — PAIN - FUNCTIONAL ASSESSMENT
PAIN_FUNCTIONAL_ASSESSMENT: PREVENTS OR INTERFERES SOME ACTIVE ACTIVITIES AND ADLS

## 2021-09-16 ASSESSMENT — PAIN DESCRIPTION - ONSET
ONSET: ON-GOING

## 2021-09-16 ASSESSMENT — PAIN DESCRIPTION - FREQUENCY
FREQUENCY: CONTINUOUS

## 2021-09-16 ASSESSMENT — PAIN DESCRIPTION - PROGRESSION
CLINICAL_PROGRESSION: NOT CHANGED
CLINICAL_PROGRESSION: GRADUALLY WORSENING
CLINICAL_PROGRESSION: NOT CHANGED

## 2021-09-16 ASSESSMENT — PAIN DESCRIPTION - ORIENTATION
ORIENTATION: LEFT

## 2021-09-16 ASSESSMENT — PAIN SCALES - WONG BAKER: WONGBAKER_NUMERICALRESPONSE: 0

## 2021-09-16 ASSESSMENT — PAIN DESCRIPTION - DESCRIPTORS
DESCRIPTORS: ACHING
DESCRIPTORS: ACHING;CONSTANT
DESCRIPTORS: ACHING
DESCRIPTORS: ACHING;CONSTANT
DESCRIPTORS: ACHING

## 2021-09-16 ASSESSMENT — PAIN SCALES - GENERAL
PAINLEVEL_OUTOF10: 6
PAINLEVEL_OUTOF10: 5
PAINLEVEL_OUTOF10: 7
PAINLEVEL_OUTOF10: 7

## 2021-09-16 NOTE — PROGRESS NOTES
PT AAO x4 per this shift. VSS. Pt tolerating PO fluids. Pt having ongoing pain to left hip. Managed with PRN medication per MD orders, ice, elevation, rest. Pt able to ambulate in room with walker. Dry dressing removed and prineo in place with ice therapy. Pedal pulses palpable and moderate. Cap refill remains brisk. Pr sleeping well throughout this shift. No further needs voiced at this time. Fall precautions in place. Bed alarm on. Call light within reach. Will continue to round.  Electronically signed by Roosevelt Winn RN on 9/16/2021 at 6:38 AM

## 2021-09-16 NOTE — PROGRESS NOTES
Pt discharged to home. Transportation here with wheelchair. Accompanied by spouse. Transported in personal vehicle. Discharge instructions, Rx, and personal belongings given to pt. Explanation of discharge medications and instructions understood by verbal statement. No questions, comments or concerns at this time.  Electronically signed by Zhane Morris RN on 9/16/2021 at 10:27 AM

## 2021-09-16 NOTE — CARE COORDINATION
[facility]  Received referral from case management   Faxed orders to [address] care to see patient by  9/18/21   Enoc Holt LPN    [address] Cell [phone], Fax [phone]

## 2021-09-16 NOTE — PROGRESS NOTES
Occupational Therapy  Facility/Department: [address] ORTHOPEDICS  Daily Treatment Note  NAME: Saurabh Villar  : 1965  MRN: [id number]    Date of Service: 2021    Discharge Recommendations:  2-3 sessions per week, Patient would benefit from continued therapy after discharge, Home with Home health OT, 24 hour supervision or assist  OT Equipment Recommendations  Equipment Needed: No    Saurabh Villar scored a 19/24 on the AM-PAC ADL Inpatient form. Current research shows that an AM-PAC score of 18 or greater is typically associated with a discharge to the patient's home setting. Based on the patient's AM-PAC score, and their current ADL deficits, it is recommended that the patient have 2-3 sessions per week of Occupational Therapy at d/c to increase the patient's independence. At this time, this patient demonstrates the endurance and safety to discharge home with [address] [place] [address] (home vs OP services) and a follow up treatment frequency of 2-3x/wk. Please see assessment section for further patient specific details. If patient discharges prior to next session this note will serve as a discharge summary. Please see below for the latest assessment towards goals. Assessment   Performance deficits / Impairments: Decreased functional mobility ; Decreased balance;Decreased safe awareness;Decreased ADL status; Decreased ROM; Decreased high-level IADLs;Decreased endurance;Decreased strength  Assessment: 63 yo male admitted 9/15 for elective L JOON- anterior. PMH: Arthritis, HTN, [place], 2016 R JOON. PTA, pt lives with spouse and independent with ADL, IADL, and fxl mobility no AD.  session: Pt progressing toward and met various goals this session. Pt educated on ant hip prec, LB dressing with AE, ice management, transfers, benefits of BSC over toilet (getting from family), and shower chair(waiting for [address] [place] [address]).  Pt now SBA donning socks and pants, SBA 5 min fxl task (grooming) in stance, SBA tx and fxl mobility and RW, and set up UB dressing. Pt and wife very attentive. Cont acute OT and rec d.c home with 24 hr supervision/assist and OT 2-3x/week  Treatment Diagnosis: impaired ADL/fxl mobility  Prognosis: Good  OT Education: OT Role;Plan of Care;Transfer Training;ADL Adaptive Strategies;Precautions  Patient Education: review ant hip prec, LB dressing with AE, ice management  REQUIRES OT FOLLOW UP: Yes  Activity Tolerance  Activity Tolerance: Patient Tolerated treatment well  Safety Devices  Safety Devices in place: Yes  Type of devices: Left in chair;Patient at risk for falls;Gait belt;Call light within reach; Chair alarm in place;Nurse notified       Patient Diagnosis(es): The primary encounter diagnosis was History of right hip replacement. A diagnosis of Arthritis of left hip was also pertinent to this visit. has a past medical history of Arthritis, High blood pressure, [place] (hard of hearing), Hyperlipidemia, Skin cancer of scalp, Sleep apnea, Uses hearing aid, and Wears glasses. has a past surgical history that includes shoulder surgery (Right); eye surgery (Bilateral); joint replacement (Right, 10/19/2016); skin biopsy; and Total hip arthroplasty (Left, 9/15/2021). Restrictions  Restrictions/Precautions  Restrictions/Precautions: Weight Bearing, Fall Risk, ROM Restrictions  Lower Extremity Weight Bearing Restrictions  Left Lower Extremity Weight Bearing: Weight Bearing As Tolerated  Position Activity Restriction  Hip Precautions: No hip external rotation  Other position/activity restrictions: No straight leg raises. No hip ER past 60 deg and no hip hyperextension    Subjective   General  Chart Reviewed: Yes  Patient assessed for rehabilitation services?: Yes  Additional Pertinent Hx: 65 yo male admitted 9/15 for elective L JOON- anterior.  PMH: Arthritis, HTN, [place], 2016 R JOON  Family / Caregiver Present: Yes (wife)  Referring Practitioner: Prachi Stokes MD  Diagnosis: R JOON  Subjective  Subjective: Pt resting in bed

## 2021-09-16 NOTE — PLAN OF CARE
Problem: Falls - Risk of:  Goal: Will remain free from falls  Description: Will remain free from falls  9/16/2021 0836 by Arik Wen RN  Outcome: Ongoing  Note: Fall risk assessment completed. Fall precautions in place. Call light within reach. Pt educated on calling for assistance before getting up. Walkway free of clutter. Will continue to monitor. 9/16/2021 0105 by Julietta Osler, RN  Outcome: Met This Shift  Note: Patient educated on fall prevention. Call light is within reach, bed locked in lowest position, personal items within reach, and bed alarm is on. Will round on patient per unit guidelines. Goal: Absence of physical injury  Description: Absence of physical injury  9/16/2021 0836 by Arik Wen RN  Outcome: Ongoing  Note: Pt is free of injury. No injury noted. Fall precautions in place. Call light within reach. Will monitor. 9/16/2021 0105 by Julietta Osler, RN  Outcome: Met This Shift  Note: Pt is free of injury. No injury noted. Fall precautions in place. Call light within reach. Will monitor. Problem: Discharge Planning:  Goal: Knowledge of discharge instructions  Description: Knowledge of discharge instructions  9/16/2021 0836 by Arik Wen RN  Outcome: Ongoing  Note: Pt is knowledgeable of discharge instructions. Will monitor and review at discharge. 9/16/2021 0105 by Julietta Osler, RN  Outcome: Ongoing  Note: Pt planning to d/t home with family        Problem: Infection - Surgical Site:  Goal: Signs of wound healing will improve  Description: Signs of wound healing will improve  9/16/2021 0836 by Arik Wen RN  Outcome: Ongoing  Note: Pt shows signs of healing. Incision is well approximated. Will monitor. 9/16/2021 0105 by Julietta Osler, RN  Outcome: Ongoing  Note: Pt assessed for infection, No signs or symptoms of surgical site noted. VVS, WBC being monitored.  Reviewed information with pt and family, pt verbalized understanding        Problem: Mobility - Impaired:  Goal: Achieve maximum mobility level  Description: Achieve maximum mobility level  9/16/2021 0836 by Sae Blum RN  Outcome: Ongoing  Note: Pt maintaining mobility. Pt ambulating with walker and SBA x1. Will monitor. 9/16/2021 0105 by Edouard Kay RN  Outcome: Ongoing  Note: Early and frequent ambulqtion encouraged. Educated patient on importance of early ambulation. Patient assisted with ambulation. Will continue to monitor. Problem: Pain - Acute:  Goal: Pain level will decrease  Description: Pain level will decrease  9/16/2021 0836 by Sae Blum RN  Outcome: Ongoing  Note: Pt assessed for pain. Pt in pain and assessed with 0-10 pain rating scale. Pt given prescribed analgesic for pain. (See eMar) Pt satisfied with pain relief thus far. Will reassess and continue to monitor. 9/16/2021 0105 by Edouard Kay RN  Outcome: Ongoing  Note: Pain /discomfort being managed with PRN analgesics per MD orders, ice, rest. Patient able to express presence and absence of pain and rate pain appropriately using numerical scale. Problem: Cardiac:  Goal: Ability to maintain vital signs within normal range will improve  Description: Ability to maintain vital signs within normal range will improve  9/16/2021 0836 by Sae Blum RN  Outcome: Ongoing  Note: Pt maintaining VS within normal limits. Will monitor. 9/16/2021 0105 by Edouard Kay RN  Outcome: Ongoing  Note:   Vitals:    09/16/21 0002   BP: 117/76   Pulse: 81   Resp: 16   Temp: 98.5 °F (36.9 °C)   SpO2: 97%          Problem: Pain:  Goal: Pain level will decrease  Description: Pain level will decrease  9/16/2021 0836 by Sae Blum RN  Outcome: Ongoing  Note: Pt assessed for pain. Pt in pain and assessed with 0-10 pain rating scale. Pt given prescribed analgesic for pain. (See eMar) Pt satisfied with pain relief thus far. Will reassess and continue to monitor.     9/16/2021 0105 by Edouard Kay RN  Outcome: Ongoing  Note: Pain

## 2021-09-16 NOTE — PROGRESS NOTES
Clinical Pharmacy Note  Medication Counseling    Reviewed new medications started during hospital admission: aspirin, pregabalin, meloxicam, oxycodone. Indications and side effects were emphasized during counseling. All medication-related questions addressed. Patient verbalized understanding of education. Should the patient express any additional questions or concerns regarding their medications, please do not hesitate to contact the pharmacy department. Patient/caregiver aware they may refuse medications during hospital stay. 10 minutes spent educating patient regarding medications.

## 2021-09-16 NOTE — PROGRESS NOTES
Data- discharge order received, patient verbalized agreement to discharge, disposition to previous residence, needs noted for Hao Marks and informed Palak Mcclain NP. Action- discharge instructions prepared and given to patient and wife scott, patient and scott verbalized understanding. Medication information packet given r/t NEW and/or CHANGED prescriptions emphasizing name/purpose/side effects, pt verbalized understanding. Discharge instruction summary: Diet- general, Activity- wbat, Primary Care Physician as followsTess Hoang [phone]. f/u appointment with orthopedic office noted below, immunizations reviewed and discussed with patient, prescription medications to be filled by retail pharmacy and then delivered. Inpatient surgical procedure precautions reviewed: . Neurovascular check performed and patient is WNLs, denies numbness/tingling in extremties. Incision site  prineo glue dressing assessed and is  clean,dry, and intact, no signs of redness, drainage, or odor noted. patient's bedside MANPREET schuster notified of patient completing discharge instructions. Nurse Navigator and Orthopedic Office contact information on discharge instructions and provided to patient. Response- Medications to be delivered to patient via meds to bed program. Disposition is home with Hao Marks, to be transported with family.      Future Appointments   Date Time Provider Donald Nassar   9/30/2021 11:00 AM MD Charles Ann           Electronically signed by Charmaine Wray RN on 9/16/2021 at 9:27 AM

## 2021-09-16 NOTE — PROGRESS NOTES
CLINICAL PHARMACY NOTE: MEDS TO BEDS    Total # of Prescriptions Filled: 4   The following medications were delivered to the patient:  Current Discharge Medication List      START taking these medications    Details   oxyCODONE (ROXICODONE) 5 MG immediate release tablet Take 1-2 tablets by mouth every 6 hours as needed for Pain for up to 5 days. Qty: 40 tablet, Refills: 0    Comments: Reduce doses taken as pain becomes manageable  Associated Diagnoses: Arthritis of left hip      aspirin 81 MG EC tablet Take 1 tablet by mouth 2 times daily  Qty: 60 tablet, Refills: 0      meloxicam (MOBIC) 7.5 MG tablet Take 1 tablet by mouth daily for 5 days  Qty: 5 tablet, Refills: 0      pregabalin (LYRICA) 75 MG capsule Take 1 capsule by mouth 2 times daily for 14 days.   Qty: 28 capsule, Refills: 0    Associated Diagnoses: Arthritis of left hip         ·   ·     Additional Documentation:

## 2021-09-16 NOTE — PROGRESS NOTES
Physical Therapy    Facility/Department: [facility] 3W ORTHOPEDICS  Treatment note    NAME: Teresa Cash  : 1965  MRN: [id number]    Date of Service: 2021    Assessment / Discharge Recommendations:  -progressing well and ready for home today  -instructed in initial HEP and general activity to do until Home PT takes charge of care  -reviewed hip precautions and he verbalizes understanding  -has rolling walker for home use    Activity Tolerance: Patient Tolerated treatment well       Patient Diagnosis(es): The primary encounter diagnosis was History of right hip replacement. A diagnosis of Arthritis of left hip was also pertinent to this visit. has a past medical history of Arthritis, High blood pressure, [place] (hard of hearing), Hyperlipidemia, Skin cancer of scalp, Sleep apnea, Uses hearing aid, and Wears glasses. has a past surgical history that includes shoulder surgery (Right); eye surgery (Bilateral); joint replacement (Right, 10/19/2016); skin biopsy; and Total hip arthroplasty (Left, 9/15/2021). Restrictions  Restrictions/Precautions  Restrictions/Precautions: Weight Bearing, Fall Risk, ROM Restrictions  Lower Extremity Weight Bearing Restrictions  Left Lower Extremity Weight Bearing: Weight Bearing As Tolerated  Position Activity Restriction  Hip Precautions: No hip external rotation  Other position/activity restrictions: No straight leg raises. No hip ER past 60 deg and no hip hyperextension  Vision/Hearing        Subjective  General  Chart Reviewed: Yes  Patient assessed for rehabilitation services?: Yes  Additional Pertinent Hx: here for elective left THR   -had right THR in 2016  Response To Previous Treatment: Patient with no complaints from previous session.   Family / Caregiver Present: Yes  Follows Commands: Within Functional Limits  Subjective  Subjective: to room following OT session - patient resting in recliner- wife present- states ready for PT session and home today-   pain at rest to 3-4/10  - pain medication just received  Patient Currently in Pain: Yes  Social/Functional History  Social/Functional History  Lives With: Spouse  Type of Home: House  Home Layout: Two level  Home Access: Stairs to enter without rails  Entrance Stairs - Number of Steps: 2  Bathroom Shower/Tub: Tub/Shower unit, Walk-in shower  Bathroom Toilet: Handicap height  Bathroom Accessibility: Accessible  Home Equipment: Standard walker, Rolling walker, 4 wheeled walker, Cane, Crutches, Reacher, Sock aid  ADL Assistance: Independent  Ambulation Assistance: Independent  Transfer Assistance: Independent  Active : Yes  Cognition   Cognition  Overall Cognitive Status: WFL  Objective  Bed mobility  Supine to Sit: Minimal assistance  Sit to Supine: Unable to assess  Transfers  Sit to Stand: Modified independent  Stand to sit: Modified independent  Ambulation  Ambulation?: Yes  Ambulation 1  Surface: level tile  Device: Rolling Walker  Assistance: Modified Independent;Supervision  Quality of Gait: step to progressing to step through pattern with good heel contact and good weight bearing - adequate base of support  Distance: in room and ortho gym as needed for household distances (>50 feet)  Comments: stable on the walker  Stairs/Curb  Stairs?:  (up and down 4 steps with one crutch and door jamb and cga of one)  Balance  Comments: steady with transfers and ambulation  Exercises  Quad Sets: 10 per hour  Gluteal Sets: 10 per hour  Ankle Pumps: 30 per hour in easy pace  Comments: encouraged continued practice with the spirometer at home for a few days     Plan   Plan  Times per week: 1-4 sessions  Current Treatment Recommendations: Strengthening, Transfer Training, Patient/Caregiver Education & Training, ADL/Self-care Training, Modalities, Gait Training, Stair training, Positioning  Safety Devices  Type of devices:  All fall risk precautions in place, Call light within reach, Left in chair, Nurse notified Rafy Wood (patient's wife remains in room to assist))  Goals  Short term goals  Time Frame for Short term goals: 1-2 days  Short term goal 1: bed mobility at [address] [place] term goal 2: transfer at supervision  Short term goal 3: ambulation at supervision wbat rolling walker for household distances    -steps as needed for home entry at [address] one rail  Short term goal 4: exercises at supervision  Patient Goals   Patient goals : relief of chronic hip pain and good walking       Therapy Time   Individual Concurrent Group Co-treatment   Time In 0850         Time Out 0930         Minutes [address], PT

## 2021-09-16 NOTE — PROGRESS NOTES
% injection 10 mL, 10 mL, IntraVENous, PRN  0.9 % sodium chloride infusion, 25 mL, IntraVENous, PRN  acetaminophen (TYLENOL) tablet 650 mg, 650 mg, Oral, Q6H  oxyCODONE (ROXICODONE) immediate release tablet 5 mg, 5 mg, Oral, Q4H PRN **OR** oxyCODONE HCl (OXY-IR) immediate release tablet 10 mg, 10 mg, Oral, Q4H PRN  morphine (PF) injection 2 mg, 2 mg, IntraVENous, Q3H PRN **OR** morphine (PF) injection 4 mg, 4 mg, IntraVENous, Q3H PRN  ondansetron (ZOFRAN-ODT) disintegrating tablet 4 mg, 4 mg, Oral, Q8H PRN **OR** ondansetron (ZOFRAN) injection 4 mg, 4 mg, IntraVENous, Q6H PRN  sennosides-docusate sodium (SENOKOT-S) 8.6-50 MG tablet 1 tablet, 1 tablet, Oral, BID  magnesium hydroxide (MILK OF MAGNESIA) 400 MG/5ML suspension 30 mL, 30 mL, Oral, Daily PRN  aspirin EC tablet 81 mg, 81 mg, Oral, BID  meloxicam (MOBIC) tablet 7.5 mg, 7.5 mg, Oral, Daily  pregabalin (LYRICA) capsule 75 mg, 75 mg, Oral, BID    ASSESSMENT AND PLAN      Post left JOON, stable exam  DVT prophylaxis ordered.  ASA 81mg twice at day for 30 days for DVT prophylaxis  PT OT for ADL's and ambulation as tolerated  SS for DC planning, home with  Home care today  IV or PO pain med as ordered    LUCRECIA Pantoja CNP  9/16/2021  8:57 AM

## 2021-09-16 NOTE — PROGRESS NOTES
Shaye performed this morning including Nurse navigator [facility], Physical therapist rohini, and Occupational therapist ana maria. Discussed plan of care, discharge plan, and dme needs if applicable for orthopedic total joint patient.   Electronically signed by Gino Griggs RN on 9/16/2021 at 8:38 AM

## 2021-09-16 NOTE — PLAN OF CARE
Problem: Falls - Risk of:  Goal: Will remain free from falls  Description: Will remain free from falls  9/16/2021 0105 by Marcio Parra RN  Outcome: Met This Shift  Note: Patient educated on fall prevention. Call light is within reach, bed locked in lowest position, personal items within reach, and bed alarm is on. Will round on patient per unit guidelines. 9/15/2021 1459 by Milli Bryan RN  Outcome: Ongoing  Goal: Absence of physical injury  Description: Absence of physical injury  9/16/2021 0105 by Marcio Parra RN  Outcome: Met This Shift  Note: Pt is free of injury. No injury noted. Fall precautions in place. Call light within reach. Will monitor. 9/15/2021 1459 by Milli Bryan RN  Outcome: Ongoing     Problem: Discharge Planning:  Goal: Knowledge of discharge instructions  Description: Knowledge of discharge instructions  9/16/2021 0105 by Marcio Parra RN  Outcome: Ongoing  Note: Pt planning to d/t home with family     9/15/2021 1459 by Milli Bryan RN  Outcome: Ongoing     Problem: Infection - Surgical Site:  Goal: Signs of wound healing will improve  Description: Signs of wound healing will improve  9/16/2021 0105 by Marcio Parra RN  Outcome: Ongoing  Note: Pt assessed for infection, No signs or symptoms of surgical site noted. VVS, WBC being monitored. Reviewed information with pt and family, pt verbalized understanding     9/15/2021 1459 by Milli Bryan RN  Outcome: Ongoing  Note: YOLY incision as bandage in place. He has been afebrile since arriving to unit. Will monitor for changes. Problem: Mobility - Impaired:  Goal: Achieve maximum mobility level  Description: Achieve maximum mobility level  9/16/2021 0105 by Marcio Parra RN  Outcome: Ongoing  Note: Early and frequent ambulqtion encouraged. Educated patient on importance of early ambulation. Patient assisted with ambulation. Will continue to monitor.     9/15/2021 1459 by Milli Bryan RN  Outcome: Ongoing  Note: Pt analgesics per MD orders, ice, rest. Patient able to express presence and absence of pain and rate pain appropriately using numerical scale.

## 2021-09-21 ENCOUNTER — POST-OP TELEPHONE (OUTPATIENT)
Dept: ORTHOPEDICS UNIT | Age: 56
End: 2021-09-21

## 2021-09-21 NOTE — PROGRESS NOTES
Spoke with patient regarding post discharge from hospital.    Incision status: No drainage, odor, or redness noted per patient    Edema/Swelling/Teds: edema noted \"pretty good\", keeping elevated, wearing teds    Pain level and status: 3/10 tolerbale     Use of pain medications: yes ; Patient stated they are no taking their pain medication oxycodone, just taking tylenol as needed. Use of ice therapy: yes     Blood thinner: aspirin ; Verified with patient that they are taking their anticoagulant as prescribed twice a day. Bowels: no constipaiton    Home Care Agency active: yes ; Claims already worked with home therapists . Outpatient therapy: n/a    Do you have all of your medications: yes    Changes in medications: no    Ortho Vitals: n/a      No other questions/concerns at this time. Encouraged patient to call Orthopedic Nurse Navigator Delaney Bach or Orthopedic office if has any questions/concerns.       Follow up appointments:    Future Appointments   Date Time Provider Donald Nassar   9/30/2021 11:00 AM Rita Quinteros MD [facility]   10/5/2021  7:[place] [id number] [facility] HOD     Electronically signed by Suzette Pastrana RN on 9/21/2021 at 2:48 PM

## 2021-09-27 NOTE — DISCHARGE SUMMARY
Physician Discharge Summary     Patient ID:  Sherif Zuluaga  [id number]  82 y.o.  1965    Admit date: 9/15/2021    Discharge date and time: 9/16/2021 10:26 AM     Admitting Physician: Tianna Gill MD     Discharge Physician: Milli Acosta    Admission Diagnoses: Arthritis of left hip [M16.12]  Primary osteoarthritis of left hip [M16.12]    Discharge Diagnoses: left hip OA    Admission Condition: good    Discharged Condition: good    Indication for Admission: Failed conservative treatment as outpatient for joint pain including PT and pain meds. This patient was then electively scheduled for total joint replacement surgery    Surgical procedure: left JOON    Consults: PT OT SS    This patient had no postoperative complications. They has PT and OT for ADL's . IV and PO pain med for pain control and was eventually DC in stable condition    Treatments: analgesia,  therapies: PT OT,  and surgery      Disposition: home    Patient Instructions:   [unfilled]  Activity: activity as tolerated  Diet: regular diet  Wound Care: keep wound clean and dry    Follow-up with ANNE Caballero in 2 weeks.     Signed:  LUCRECIA Harper CNP  9/27/2021  2:19 PM

## 2021-09-30 ENCOUNTER — OFFICE VISIT (OUTPATIENT)
Dept: ORTHOPEDIC SURGERY | Age: 56
End: 2021-09-30

## 2021-09-30 DIAGNOSIS — Z96.642 HISTORY OF TOTAL HIP ARTHROPLASTY, LEFT: ICD-10-CM

## 2021-09-30 DIAGNOSIS — Z96.641 HISTORY OF TOTAL HIP ARTHROPLASTY, RIGHT: Primary | ICD-10-CM

## 2021-09-30 PROCEDURE — 99024 POSTOP FOLLOW-UP VISIT: CPT | Performed by: PHYSICIAN ASSISTANT

## 2021-10-01 NOTE — PROGRESS NOTES
This dictation was done with iCAD dictation and may contain mechanical errors related to translation. There were no vitals taken for this visit. This is a pleasant 27-year-old gentleman who recently had a left total hip replacement on 9/15/2021. All in all he is doing extremely well he only has 2 out of 10 pain to his incision talked about wound care. He was sent for x-rays including an AP pelvis and a 2 view left hip. On examination he is fair hip flexion abduction strength he is neurologically intact to his left foot he has good dorsiflexion plantarflexion strength. Three views of the total Hip arthroplasty were done today including an AP pelvis and a 2 view hip. This reveals satisfactory alignment of the prosthesis with good sizing and fit. There is good version of the cup and no subsiding of the stem. . No signs of significant polyethylene wear or failure. No progressive radiolucencies,fractures, tumors or dislocations. At this point we will have him continue  with the physical therapy per the protocol and follow-up in 4 weeks.

## 2021-10-04 DIAGNOSIS — Z96.641 HISTORY OF TOTAL HIP ARTHROPLASTY, RIGHT: Primary | ICD-10-CM

## 2021-10-04 DIAGNOSIS — Z96.642 H/O TOTAL HIP ARTHROPLASTY, LEFT: ICD-10-CM

## 2021-10-05 ENCOUNTER — HOSPITAL ENCOUNTER (OUTPATIENT)
Dept: PHYSICAL THERAPY | Age: 56
Setting detail: THERAPIES SERIES
Discharge: HOME | End: 2021-10-05
Payer: COMMERCIAL

## 2021-10-05 PROCEDURE — 97112 NEUROMUSCULAR REEDUCATION: CPT | Performed by: PHYSICAL THERAPIST

## 2021-10-05 PROCEDURE — 97161 PT EVAL LOW COMPLEX 20 MIN: CPT | Performed by: PHYSICAL THERAPIST

## 2021-10-05 PROCEDURE — 97110 THERAPEUTIC EXERCISES: CPT | Performed by: PHYSICAL THERAPIST

## 2021-10-05 NOTE — FLOWSHEET NOTE
[facility], [place]                                                         Physical Therapy Daily Treatment Note  Date:  10/5/2021    Patient Name:  Lisbeth Whitehead    :  1965  MRN: [id number]    Medical/Treatment Diagnosis Information:  · Diagnosis: W21.323 (ICD-10-CM) - H/O total hip arthroplasty, left on 9/15/21  · Treatment Diagnosis: M25.552 (L) hip pain; M62.81 muscle weakness  Insurance/Certification information:  PT Insurance Information: [facility]- no auth  Physician Information:  Referring Practitioner: Dr. Марина Izaguirre MD  Has the plan of care been signed (Y/N):        []  Yes  [x]  No     Date of Patient follow up with Physician: 10/28/21      Is this a Progress Report:     []  Yes  [x]  No        If Yes:  Date Range for reporting period:  Beginning- 10/5/2021  Ending    Progress report will be due (10 Rx or 30 days whichever is less): 10 visits or        Recertification will be due (POC Duration  / 90 days whichever is less): as above        Visit # Insurance Allowable Auth Required   1 19, used one priorly this year []  Yes [x]  No        Functional Scale: WOMAC- 28%    Date assessed:  10/5/2021     Latex Allergy:  [x]NO      []YES  Preferred Language for Healthcare:   [x]English       []other:    Pain level:  0/10  on 10/5/2021    SUBJECTIVE:  See eval    OBJECTIVE: See eval   Observation:    Test measurements:    Joint mobility: n/t              []?Normal                       []?Hypo              []?Hyper     Palpation: effusion lateral thigh along IT band     Functional Mobility/Transfers: Indep     Posture:  WNL     Bandages/Dressings/Incisions: incision healing well      Gait: (include devices/WB status) decreased L hip extn ; arrived carrying cane in hand/not using     Single leg stance eyes open- R: 28 secs ;L: 8 secs sore lateral hip              SLS eyes closed- R: ; L:      Squats: mild weight shift to R LE                   ROM PROM AROM Comments     Left Right Left Right     Flexion     55 93     Extension             Abduction 20 32         Adduction             ER 35 30         IR 20 37                          Strength Left Right Comments   Hip flexors Not resisted due to stiffness with active motion 4+     Hip extension         Hip abduction         Hip adduction         Hip ER         Hip IR         Quads 4  5     Hamstrings 4 5        Flexibility Left Right Comments   Hamstrings 50 70 90/90 position   ITB (Obers test)         Hip flexor(Navjot test)         gastroc Fair-  fair     Rectus femoris(Elys test)     Heel to buttock distance                    RESTRICTIONS/PRECAUTIONS: none    Exercises/Interventions:     Exercise/Equipment Resistance Repetitions Other comments   bike      Stretching      Hamstring  3 x 30 secs seated    Hip Flexor      ITB- Rope      Grion      Quad      Inclined Calf  3 x 30 secs runners    Towel Pull      Piriformis- figure 4      Knee to opposite shoulder                  SLR      Hip flexion      Hip extension      Hip Abduction  Standing 3 x 10 L    Standing hip marches  X 20 B    LAQ  3 x 10     Standing HS curl  3 x 10           Hip Adducton      SLR+      clamshells      Isometrics      Quad sets      Ball Squeezes      Patellar Glides      Medial      Superior      Inferior            ROM      Passive  Reclined sheet pulls 10 x 10 secs Working on hip ROM   Active      Weight Shift      Hang Weights      Sheet Pulls      Ankle Pumps            CKC      Calf raises  3 x 10     Wall sits      Step ups      1 leg stand      Squatting      Single leg dead-lift      Isometric hip abduction into wall      Hip hikes      CC TKE      Balance      Monster Walks      Bridging      Triple threats      Stool Scoots      PRE      Extension   RANGE:   Flexion   RANGE:         Cable Column            Leg Press   RANGE:         Bike      Treadmill            Manual treatment      Myofascial stick massage To IT band and quad x 5 misn pt sidelying with pillow between legs             Patient education: Pt was educated on PT diagnosis, prognosis, and plan of care. Pt was educated on the use of ice throughout the day. Reviewed insurance benefits for physical therapy in an outpatient hospital based setting with the patient, including deductible of met and allowable visit number. Pt was informed of possible out of pocket costs      Therapeutic Exercise and NMR EXR  [x] (17442) Provided verbal/tactile cueing for activities related to strengthening, flexibility, endurance, ROM for improvements in LE, proximal hip, and core control with self care, mobility, lifting, ambulation.  [] (04897) Provided verbal/tactile cueing for activities related to improving balance, coordination, kinesthetic sense, posture, motor skill, proprioception  to assist with LE, proximal hip, and core control in self care, mobility, lifting, ambulation and eccentric single leg control.      NMR and Therapeutic Activities:    [x] (86656 or 90642) Provided verbal/tactile cueing for activities related to improving balance, coordination, kinesthetic sense, posture, motor skill, proprioception and motor activation to allow for proper function of core, proximal hip and LE with self care and ADLs  [] (62697) Gait Re-education- Provided training and instruction to the patient for proper LE, core and proximal hip recruitment and positioning and eccentric body weight control with ambulation re-education including up and down stairs     Home Exercise Program:    [x] (15095) Reviewed/Progressed HEP activities related to strengthening, flexibility, endurance, ROM of core, proximal hip and LE for functional self-care, mobility, lifting and ambulation/stair navigation   [] (50834)Reviewed/Progressed HEP activities related to improving balance, coordination, kinesthetic sense, posture, motor skill, proprioception of core, proximal hip and LE for self care, mobility, lifting, and ambulation/stair navigation    Access Code: [email or web address]  URL: [email or web address]  Date: 10/05/2021  Prepared by: Milan Donnelly    Exercises  Seated Table Hamstring Stretch - 2 x daily - 7 x weekly - 5 reps - 1 sets - 30 hold  Standing Gastroc Stretch - 2 x daily - 7 x weekly - 1 sets - 5 reps - 30 hold  Sitting Heel Slide with Towel - 2 x daily - 7 x weekly - 10 reps - 1 sets - 10 hold  Standing Hip Abduction with Counter Support - 1 x daily - 7 x weekly - 3 sets - 10 reps  Standing March - 1 x daily - 7 x weekly - 3 sets - 10 reps  Sitting Knee Extension with Resistance - 1 x daily - 7 x weekly - 3 sets - 10 reps  Standing Hamstring Curl with Resistance - 1 x daily - 7 x weekly - 3 sets - 10 reps  Standing Heel Raise - 1 x daily - 7 x weekly - 3 sets - 10 reps    Manual Treatments:  PROM / STM / Oscillations-Mobs:  G-I, II, III, IV (PA's, Inf., Post.)  [x] (86306) Provided manual therapy to mobilize LE, proximal hip and/or LS spine soft tissue/joints for the purpose of modulating pain, promoting relaxation,  increasing ROM, reducing/eliminating soft tissue swelling/inflammation/restriction, improving soft tissue extensibility and allowing for proper ROM for normal function with self care, mobility, lifting and ambulation. Modalities: Will ice at home   [] GAME READY (VASO)- for significant edema, swelling, pain control. Charges:  Timed Code Treatment Minutes: 26   Total Treatment Minutes: 58     [x] EVAL (LOW) 54681   [] EVAL (MOD) 39690   [] EVAL (HIGH) 19486   [] RE-EVAL   [x] VS(74536) x   1  [] IONTO  [x] NMR (52447) x  1   [] VASO  [] Manual (58036) x      [] Other:  [] TA x      [] Mech Traction (76453)  [] ES(attended) (84095)      [] ES (un) (92677):       GOALS: Patient stated goal: make L hip feel as good as R hip. Get back to walking and hiking      Therapist goals for Patient:  Short Term Goals: To be achieved in: 2 weeks  1. Independent in HEP and progression per patient tolerance, in order to prevent re-injury. []? Progressing: []? Met: []? Not Met: []? Adjusted   2. Patient will have a decrease in pain to facilitate improvement in movement, function, and ADLs as indicated by Functional Deficits. []? Progressing: []? Met: []? Not Met: []? Adjusted     Long Term Goals: To be achieved in: 8 weeks  1. Disability index score of 16% or less for the [facility] to assist with reaching prior level of function. []? Progressing: []? Met: []? Not Met: []? Adjusted  2. Patient will demonstrate increased L hip AROM to 100 flex, 40 abd to allow for proper joint functioning as indicated by patients Functional Deficits. []? Progressing: []? Met: []? Not Met: []? Adjusted  3. Patient will demonstrate an increase in L hip strength to 4/5 hip flex and abd and extn in LE to allow for proper functional mobility as indicated by patients Functional Deficits. []? Progressing: []? Met: []? Not Met: []? Adjusted  4. Patient will return to full community ambulation including up and down steps without increased symptoms or restriction. []? Progressing: []? Met: []? Not Met: []? Adjusted  5. Pt will return to going on 1 mile walk without pain or limitation in L hip.   []? Progressing: []? Met: []? Not Met: []? Adjusted           Overall Progression Towards Functional goals/ Treatment Progress Update:  [] Patient is progressing as expected towards functional goals listed. [] Progression is slowed due to complexities/Impairments listed. [] Progression has been slowed due to co-morbidities.   [x] Plan just implemented, too soon to assess goals progression <30days   [] Goals require adjustment due to lack of progress  [] Patient is not progressing as expected and requires additional follow up with physician  [] Other    Prognosis for POC: [x] Good [] Fair  [] Poor      Patient requires continued skilled intervention: [x] Yes  [] No      ASSESSMENT:  See

## 2021-10-05 NOTE — PLAN OF CARE
Jessica [address], [address] [place] [address], [address] [place] [address]  Phone: [phone]   Fax: [phone]                                                              Physical Therapy Certification    Dear Referring Practitioner: Dr. Nicholas Gonzales MD,    We had the pleasure of evaluating the following patient for physical therapy services at [address] [place] [address]. A summary of our findings can be found in the initial assessment below. This includes our plan of care. If you have any questions or concerns regarding these findings, please do not hesitate to contact me at the office phone number checked above. Thank you for the referral.       Physician Signature:_______________________________Date:__________________  By signing above (or electronic signature), therapists plan is approved by physician      Patient: Jose F Singleton   : 1965   MRN: [id number]  Referring Physician: Referring Practitioner: Dr. Nicholas Gonzales MD      Evaluation Date: 10/5/2021      Medical Diagnosis Information:  Diagnosis: N87.867 (ICD-10-CM) - H/O total hip arthroplasty, left on 9/15/21   Treatment Diagnosis: M25.552 (L) hip pain; M62.81 muscle weakness                                           Precautions/ Contra-indications: none    C-SSRS Triggered by Intake questionnaire (Past 2 wk assessment):   [x] No, Questionnaire did not trigger screening.   [] Yes, Patient intake triggered further evaluation      [] C-SSRS Screening completed  [] PCP notified via Plan of Care  [] Emergency services notified     Latex Allergy:  [x]NO      []YES  Preferred Language for Healthcare:   [x]English       []other:    SUBJECTIVE: Patient stated complaint: Pt presents post anterior L TKA done on 9/15/21. Pt says L hip has been bothering him worse over the last year prior to surgery. Pt has had home health PT 4 times since surgery.  Pt saw [facility] [place] and said looking good and x-rays and Hip abduction      Hip adduction      Hip ER      Hip IR      Quads 4  5    Hamstrings 4 5      Flexibility Left Right Comments   Hamstrings 50 70 90/90 position   ITB (Obers test)      Hip flexor(Navjot test)      gastroc Fair-  fair    Rectus femoris(Elys test)   Heel to buttock distance           Orthopedic Special Tests: deferred    Special  Test Left Right Comments   FABERS      Scour test      Impingement test/FADIR      Trendelenburg test      Log Roll      Stinchfield (resisted hip flex SLR)      SLR test      Slump test      Leg length (ASIS to medial malleolus)       Athletic pubalgia (resisted hip add, sit up, hip flex tests)                                        [x] Patient history, allergies, meds reviewed. Medical chart reviewed. See intake form. Review Of Systems (ROS):  [x]Performed Review of systems (Integumentary, CardioPulmonary, Neurological) by intake and observation. Intake form has been scanned into medical record. Patient has been instructed to contact their primary care physician regarding ROS issues if not already being addressed at this time.       Co-morbidities/Complexities (which will affect course of rehabilitation):   []None           Arthritic conditions   []Rheumatoid arthritis (M05.9)  [x]Osteoarthritis (M19.91)   Cardiovascular conditions   [x]Hypertension (I10)  []Hyperlipidemia (E78.5)  []Angina pectoris (I20)  []Atherosclerosis (I70)   Musculoskeletal conditions   []Disc pathology   []Congenital spine pathologies   []Prior surgical intervention  []Osteoporosis (M81.8)  []Osteopenia (M85.8)   Endocrine conditions   []Hypothyroid (E03.9)  []Hyperthyroid Gastrointestinal conditions   []Constipation (Q30.38)   Metabolic conditions   []Morbid obesity (E66.01)  []Diabetes type 1(E10.65) or 2 (E11.65)   []Neuropathy (G60.9)     Pulmonary conditions   []Asthma (J45)  []Coughing   []COPD (J44.9)   Psychological Disorders  []Anxiety (F41.9)  []Depression (F32.9)   []Other: [x]Other: history of skin cancer          Barriers to/and or personal factors that will affect rehab potential:              []Age  []Sex              []Motivation/Lack of Motivation                        []Co-Morbidities              []Cognitive Function, education/learning barriers              []Environmental, home barriers              []profession/work barriers  []past PT/medical experience  []other:  Justification:     Falls Risk Assessment (30 days):   [x] Falls Risk assessed and no intervention required. [] Falls Risk assessed and Patient requires intervention due to being higher risk   TUG score (>12s at risk):     [] Falls education provided, including        ASSESSMENT: Pt is a 64y.o. year old male with signs and symptoms consistent with s/p L anterior JOON. Pt presents with decreased strength; decreased ROM; increased pain; decreased flexibility; mild antalgic gait; poor balance; and decreased functional mobility and ADLs. Pt will benefit from skilled physical therapy services to address above limitations through strengthening, stretching, ROM exercises, modalities as need for pain control and swelling, gait and balance training, manual tx, instruction on HEP, and education for return to functional mobility.      Functional Impairments:     []Noted lumbar/proximal hip/LE joint hypomobility   [x]Decreased LE functional ROM   [x]Decreased core/proximal hip strength and neuromuscular control   [x]Decreased LE functional strength   [x]Reduced balance/proprioceptive control   []other:      Functional Activity Limitations (from functional questionnaire and intake)   [x]Reduced ability to tolerate prolonged functional positions   [x]Reduced ability or difficulty with changes of positions or transfers between positions   []Reduced ability to maintain good posture and demonstrate good body mechanics with sitting, bending, and lifting   []Reduced ability to sleep   [x] Reduced ability or tolerance with driving and/or computer work   [x]Reduced ability to perform lifting, carrying tasks   [x]Reduced ability to squat   [x]Reduced ability to forward bend   [x]Reduced ability to ambulate prolonged functional periods/distances/surfaces   [x]Reduced ability to ascend/descend stairs   [x]Reduced ability to run, hop, cut or jump   []other:    Participation Restrictions   [x]Reduced participation in self care activities   [x]Reduced participation in home management activities   [x]Reduced participation in work activities   [x]Reduced participation in social activities. [x]Reduced participation in sport/recreation activities. Classification :    [x]Signs/symptoms consistent with post-surgical status including decreased ROM, strength and function. []Signs/symptoms consistent with joint sprain/strain   []Signs/symptoms consistent with patella-femoral syndrome   []Signs/symptoms consistent with knee OA/hip OA   []Signs/symptoms consistent with internal derangement of knee/Hip   []Signs/symptoms consistent with functional hip weakness/NMR control      []Signs/symptoms consistent with tendinitis/tendinosis    []signs/symptoms consistent with pathology which may benefit from Dry needling      []other:      Prognosis/Rehab Potential:      []Excellent   [x]Good    []Fair   []Poor    Tolerance of evaluation/treatment:    []Excellent   [x]Good    []Fair   []Poor    PLAN  Frequency/Duration:  2 days per week for 8 Weeks:  Interventions:  [x]  Therapeutic exercise including: strength training, ROM, for Lower extremity and core   [x]  NMR activation and proprioception for LE, Glutes and Core   [x]  Manual therapy as indicated for LE, Hip and spine to include: Dry Needling/IASTM, STM, PROM, Gr I-IV mobilizations, manipulation.    [x] Modalities as needed that may include: thermal agents, E-stim, Biofeedback, US, iontophoresis as indicated  [x] Patient education on joint protection, postural re-education, activity modification, progression of HEP. HEP instruction: Pt was instructed in, and safely and correctly demonstrated home exercise program. Patient verbalized understanding of proper frequency of exercises. Copy of exercises was scanned into patient chart and can be fount in the media file. GOALS:  Patient stated goal: make L hip feel as good as R hip. Get back to walking and hiking     Therapist goals for Patient:  Short Term Goals: To be achieved in: 2 weeks  1. Independent in HEP and progression per patient tolerance, in order to prevent re-injury. [] Progressing: [] Met: [] Not Met: [] Adjusted   2. Patient will have a decrease in pain to facilitate improvement in movement, function, and ADLs as indicated by Functional Deficits. [] Progressing: [] Met: [] Not Met: [] Adjusted    Long Term Goals: To be achieved in: 8 weeks  1. Disability index score of 16% or less for the [facility] to assist with reaching prior level of function. [] Progressing: [] Met: [] Not Met: [] Adjusted  2. Patient will demonstrate increased L hip AROM to 100 flex, 40 abd to allow for proper joint functioning as indicated by patients Functional Deficits. [] Progressing: [] Met: [] Not Met: [] Adjusted  3. Patient will demonstrate an increase in L hip strength to 4/5 hip flex and abd and extn in LE to allow for proper functional mobility as indicated by patients Functional Deficits. [] Progressing: [] Met: [] Not Met: [] Adjusted  4. Patient will return to full community ambulation including up and down steps without increased symptoms or restriction. [] Progressing: [] Met: [] Not Met: [] Adjusted  5. Pt will return to going on 1 mile walk without pain or limitation in L hip.    [] Progressing: [] Met: [] Not Met: [] Adjusted     Physical Therapy Evaluation Complexity Justification  [x] A history of present problem with:  [x] no personal factors and/or comorbidities that impact the plan of care;  []1-2 personal factors and/or comorbidities that impact the plan of care  []3 personal factors and/or comorbidities that impact the plan of care  [x] An examination of body systems using standardized tests and measures addressing any of the following: body structures and functions (impairments), activity limitations, and/or participation restrictions;:  [] a total of 1-2 or more elements   [] a total of 3 or more elements   [x] a total of 4 or more elements   [x] A clinical presentation with:  [x] stable and/or uncomplicated characteristics   [] evolving clinical presentation with changing characteristics  [] unstable and unpredictable characteristics;   [x] Clinical decision making of [x] low, [] moderate, [] high complexity using standardized patient assessment instrument and/or measurable assessment of functional outcome.     [x] EVAL (LOW) 97953 (typically 20 minutes face-to-face)  [] EVAL (MOD) 62976 (typically 30 minutes face-to-face)  [] EVAL (HIGH) 38414 (typically 45 minutes    Electronically signed by:  Arik Hill, PT, PT, DP

## 2021-10-08 ENCOUNTER — HOSPITAL ENCOUNTER (OUTPATIENT)
Dept: PHYSICAL THERAPY | Age: 56
Setting detail: THERAPIES SERIES
Discharge: HOME | End: 2021-10-08
Payer: COMMERCIAL

## 2021-10-08 PROCEDURE — 97110 THERAPEUTIC EXERCISES: CPT

## 2021-10-08 PROCEDURE — 97112 NEUROMUSCULAR REEDUCATION: CPT

## 2021-10-08 NOTE — FLOWSHEET NOTE
[address] [facility], [place]                                                         Physical Therapy Daily Treatment Note  Date:  10/8/2021    Patient Name:  Earl Willis    :  1965  MRN: [id number]    Medical/Treatment Diagnosis Information:  · Diagnosis: G49.655 (ICD-10-CM) - H/O total hip arthroplasty, left on 9/15/21  · Treatment Diagnosis: M25.552 (L) hip pain; M62.81 muscle weakness  Insurance/Certification information:  PT Insurance Information: [facility]- no auth  Physician Information:  Referring Practitioner: Dr. Mj Mike MD  Has the plan of care been signed (Y/N):        []  Yes  [x]  No     Date of Patient follow up with Physician: 10/28/21      Is this a Progress Report:     []  Yes  [x]  No        If Yes:  Date Range for reporting period:  Beginning- 10/5/2021  Ending    Progress report will be due (10 Rx or 30 days whichever is less): 10 visits or 84/3/72       Recertification will be due (POC Duration  / 90 days whichever is less): as above        Visit # Insurance Allowable Auth Required   2 19, used one priorly this year []  Yes [x]  No        Functional Scale: WOMAC- 28%    Date assessed:  10/5/2021     Latex Allergy:  [x]NO      []YES  Preferred Language for Healthcare:   [x]English       []other:    Pain level:  2/10  on 10/8/2021    SUBJECTIVE:  Pt notes he feels tight this morning. He did get ankle weights and has been using those. He notes he liked the myofascial roller, helped to loosen     OBJECTIVE: See eval   Observation:    Test measurements:    Joint mobility: n/t              []?Normal                       []?Hypo              []?Hyper     Palpation: effusion lateral thigh along IT band     Functional Mobility/Transfers: Indep     Posture:  WNL     Bandages/Dressings/Incisions: incision healing well      Gait: (include devices/WB status) decreased L hip extn ; arrived carrying cane in hand/not using     Single leg stance eyes open- R: 28 secs ;L: 8 secs sore lateral hip              SLS eyes closed- R: ; L:      Squats: mild weight shift to R LE                   ROM PROM AROM Comments     Left Right Left Right     Flexion     55 93     Extension             Abduction 20 32         Adduction             ER 35 30         IR 20 37                          Strength Left Right Comments   Hip flexors Not resisted due to stiffness with active motion 4+     Hip extension         Hip abduction         Hip adduction         Hip ER         Hip IR         Quads 4  5     Hamstrings 4 5        Flexibility Left Right Comments   Hamstrings 50 70 90/90 position   ITB (Obers test)         Hip flexor(Navjot test)         gastroc Fair-  fair     Rectus femoris(Elys test)     Heel to buttock distance                    RESTRICTIONS/PRECAUTIONS: none    Exercises/Interventions:     Exercise/Equipment Resistance Repetitions Other comments   bike      Stretching      Hamstring  3 x 30 secs seated    Hip Flexor      ITB- standing   [id number] gentle     Grion      Quad      Inclined Calf  3 x 30 secs runners    Towel Pull      Piriformis- figure 4      Knee to opposite shoulder                  SLR      Hip flexion      Hip extension      Hip Abduction  Standing 3 x 10 L    Standing hip marches  X 20 B    LAQ  3 x 10 3#    Standing HS curl  3 x 10 3#          Hip Adducton      SLR+      clamshells      Isometrics      Quad sets      Ball Squeezes      Patellar Glides      Medial      Superior      Inferior            ROM      Passive  Reclined sheet pulls 10 x 10 secs Working on hip ROM   Active      Weight Shift      Hang Weights      Sheet Pulls      Ankle Pumps            CKC      Calf raises  3 x 10     Wall sits      Step ups      1 leg stand  [id number]     Squatting      Single leg dead-lift      Isometric hip abduction into wall      Hip hikes      CC TKE      Balance      Monster Walks      Bridging  3x5    Triple threats      Stool Scoots      PRE      Extension   RANGE:   Flexion   RANGE:         Cable Column            Leg Press   RANGE:         Bike      Treadmill            Manual treatment      Myofascial stick massage To IT band and quad x 5 misn pt sidelying with pillow between legs             Patient education: Pt was educated on PT diagnosis, prognosis, and plan of care. Pt was educated on the use of ice throughout the day. Reviewed insurance benefits for physical therapy in an outpatient hospital based setting with the patient, including deductible of met and allowable visit number. Pt was informed of possible out of pocket costs      Therapeutic Exercise and NMR EXR  [x] (63897) Provided verbal/tactile cueing for activities related to strengthening, flexibility, endurance, ROM for improvements in LE, proximal hip, and core control with self care, mobility, lifting, ambulation.  [] (61831) Provided verbal/tactile cueing for activities related to improving balance, coordination, kinesthetic sense, posture, motor skill, proprioception  to assist with LE, proximal hip, and core control in self care, mobility, lifting, ambulation and eccentric single leg control.      NMR and Therapeutic Activities:    [x] (17024 or 37022) Provided verbal/tactile cueing for activities related to improving balance, coordination, kinesthetic sense, posture, motor skill, proprioception and motor activation to allow for proper function of core, proximal hip and LE with self care and ADLs  [] (46843) Gait Re-education- Provided training and instruction to the patient for proper LE, core and proximal hip recruitment and positioning and eccentric body weight control with ambulation re-education including up and down stairs     Home Exercise Program:    [x] (20126) Reviewed/Progressed HEP activities related to strengthening, flexibility, endurance, ROM of core, proximal hip and LE for functional self-care, mobility, lifting and ambulation/stair navigation   [] (82136)Reviewed/Progressed HEP activities related to improving balance, coordination, kinesthetic sense, posture, motor skill, proprioception of core, proximal hip and LE for self care, mobility, lifting, and ambulation/stair navigation    Access Code: [email or web address]  URL: [email or web address]  Date: 10/05/2021  Prepared by: Baby Reins    Exercises  Seated Table Hamstring Stretch - 2 x daily - 7 x weekly - 5 reps - 1 sets - 30 hold  Standing Gastroc Stretch - 2 x daily - 7 x weekly - 1 sets - 5 reps - 30 hold  Sitting Heel Slide with Towel - 2 x daily - 7 x weekly - 10 reps - 1 sets - 10 hold  Standing Hip Abduction with Counter Support - 1 x daily - 7 x weekly - 3 sets - 10 reps  Standing March - 1 x daily - 7 x weekly - 3 sets - 10 reps  Sitting Knee Extension with Resistance - 1 x daily - 7 x weekly - 3 sets - 10 reps  Standing Hamstring Curl with Resistance - 1 x daily - 7 x weekly - 3 sets - 10 reps  Standing Heel Raise - 1 x daily - 7 x weekly - 3 sets - 10 reps    Manual Treatments:  PROM / STM / Oscillations-Mobs:  G-I, II, III, IV (PA's, Inf., Post.)  [x] (48741) Provided manual therapy to mobilize LE, proximal hip and/or LS spine soft tissue/joints for the purpose of modulating pain, promoting relaxation,  increasing ROM, reducing/eliminating soft tissue swelling/inflammation/restriction, improving soft tissue extensibility and allowing for proper ROM for normal function with self care, mobility, lifting and ambulation. Modalities: Will ice at home   [] GAME READY (VASO)- for significant edema, swelling, pain control.      Charges:  Timed Code Treatment Minutes: 38   Total Treatment Minutes: 45     [] EVAL (LOW) 85466   [] EVAL (MOD) 18940   [] EVAL (HIGH) 00138   [] RE-EVAL   [x] LO(89462) x   1  [] IONTO  [x] NMR (88474) x  2   [] VASO  [] Manual (33731) x      [] Other:  [] TA x      [] Mech Traction (70247)  [] ES(attended) (15154)      [] ES (un) (81544):

## 2021-10-12 ENCOUNTER — HOSPITAL ENCOUNTER (OUTPATIENT)
Dept: PHYSICAL THERAPY | Age: 56
Setting detail: THERAPIES SERIES
Discharge: HOME | End: 2021-10-12
Payer: COMMERCIAL

## 2021-10-12 PROCEDURE — 97112 NEUROMUSCULAR REEDUCATION: CPT

## 2021-10-12 PROCEDURE — 97110 THERAPEUTIC EXERCISES: CPT

## 2021-10-12 PROCEDURE — 97140 MANUAL THERAPY 1/> REGIONS: CPT

## 2021-10-12 NOTE — FLOWSHEET NOTE
[facility], [place]                                                         Physical Therapy Daily Treatment Note  Date:  10/12/2021    Patient Name:  Khushboo Goodson    :  1965  MRN: [id number]    Medical/Treatment Diagnosis Information:  · Diagnosis: P68.204 (ICD-10-CM) - H/O total hip arthroplasty, left on 9/15/21  · Treatment Diagnosis: M25.552 (L) hip pain; M62.81 muscle weakness  Insurance/Certification information:  PT Insurance Information: [facility]- no auth  Physician Information:  Referring Practitioner: Dr. Corinne Hogan MD  Has the plan of care been signed (Y/N):        []  Yes  [x]  No     Date of Patient follow up with Physician: 10/28/21      Is this a Progress Report:     []  Yes  [x]  No        If Yes:  Date Range for reporting period:  Beginning- 10/5/2021  Ending    Progress report will be due (10 Rx or 30 days whichever is less): 10 visits or 68/3/08       Recertification will be due (POC Duration  / 90 days whichever is less): as above        Visit # Insurance Allowable Auth Required   3 19, used one priorly this year []  Yes [x]  No        Functional Scale: WOMAC- 28%    Date assessed:  10/5/2021     Latex Allergy:  [x]NO      []YES  Preferred Language for Healthcare:   [x]English       []other:    Pain level:  2/10  on 10/12/2021    SUBJECTIVE:  *Pt notes that he feels fairly tight still along lateral side of leg. He also feels like he is still swollen along left leg as compared to the right. OBJECTIVE: See eval   Observation:    Test measurements:    Joint mobility: n/t              []?Normal                       []?Hypo              []?Hyper     Palpation: effusion lateral thigh along IT band     Functional Mobility/Transfers: Indep     Posture:  WNL     Bandages/Dressings/Incisions: incision healing well      Gait: (include devices/WB status) decreased L hip extn ; arrived carrying cane in hand/not using     Single leg stance eyes open- R: 28 secs ;L: 8 secs sore lateral hip              SLS eyes closed- R: ; L:      Squats: mild weight shift to R LE                   ROM PROM AROM Comments     Left Right Left Right     Flexion     55 93     Extension             Abduction 20 32         Adduction             ER 35 30         IR 20 37                          Strength Left Right Comments   Hip flexors Not resisted due to stiffness with active motion 4+     Hip extension         Hip abduction         Hip adduction         Hip ER         Hip IR         Quads 4  5     Hamstrings 4 5        Flexibility Left Right Comments   Hamstrings 50 70 90/90 position   ITB (Obers test)         Hip flexor(Navjot test)         gastroc Fair-  fair     Rectus femoris(Elys test)     Heel to buttock distance                    RESTRICTIONS/PRECAUTIONS: none    Exercises/Interventions:     Exercise/Equipment Resistance Repetitions Other comments   bike      Stretching      Hamstring  3 x 30 secs seated    Hip Flexor      ITB- standing   [id number] gentle     Grion      Quad      Inclined Calf  3 x 30 secs runners    Towel Pull      Piriformis- figure 4      Knee to opposite shoulder                  SLR      Hip flexion      Hip extension      Hip Abduction  3 x 5-8     Standing hip marches  X 20 B    LAQ  3 x 10 3#    Standing HS curl  3 x 10 3#          Hip Adducton      SLR+      clamshells  3x5-8    Isometrics      Quad sets      Ball Squeezes      Patellar Glides      Medial      Superior      Inferior            ROM      Working on hip ROM   Active      Weight Shift      Hang Weights      Sheet Pulls      Ankle Pumps            CKC      Calf raises  3 x 10     Wall sits      Step ups      1 leg stand  [id number]     Squatting      Single leg dead-lift      Isometric hip abduction into wall      Hip hikes      CC TKE      rockerboard  [id number] mini squat hold     Balance      Monster Walks      Bridging  3x5    Triple threats      Stool Scoots      PRE      Extension   RANGE:   Flexion  3x10 SL ECL 30#  RANGE:         Cable Column            Leg Press   RANGE:         Bike      Treadmill            Manual treatment          IASTM  See below        Patient education: Pt was educated on PT diagnosis, prognosis, and plan of care. Pt was educated on the use of ice throughout the day. Reviewed insurance benefits for physical therapy in an outpatient hospital based setting with the patient, including deductible of met and allowable visit number. Pt was informed of possible out of pocket costs      Therapeutic Exercise and NMR EXR  [x] (77624) Provided verbal/tactile cueing for activities related to strengthening, flexibility, endurance, ROM for improvements in LE, proximal hip, and core control with self care, mobility, lifting, ambulation.  [] (66203) Provided verbal/tactile cueing for activities related to improving balance, coordination, kinesthetic sense, posture, motor skill, proprioception  to assist with LE, proximal hip, and core control in self care, mobility, lifting, ambulation and eccentric single leg control.      NMR and Therapeutic Activities:    [x] (00634 or 70797) Provided verbal/tactile cueing for activities related to improving balance, coordination, kinesthetic sense, posture, motor skill, proprioception and motor activation to allow for proper function of core, proximal hip and LE with self care and ADLs  [] (24224) Gait Re-education- Provided training and instruction to the patient for proper LE, core and proximal hip recruitment and positioning and eccentric body weight control with ambulation re-education including up and down stairs     Home Exercise Program:    [x] (97811) Reviewed/Progressed HEP activities related to strengthening, flexibility, endurance, ROM of core, proximal hip and LE for functional self-care, mobility, lifting and ambulation/stair navigation   [] (84887)Reviewed/Progressed HEP activities related to improving balance, coordination, kinesthetic sense, posture, motor skill, proprioception of core, proximal hip and LE for self care, mobility, lifting, and ambulation/stair navigation    Access Code: [email or web address]  URL: [email or web address]  Date: 10/05/2021  Prepared by: Parviz Gasca    Exercises  Seated Table Hamstring Stretch - 2 x daily - 7 x weekly - 5 reps - 1 sets - 30 hold  Standing Gastroc Stretch - 2 x daily - 7 x weekly - 1 sets - 5 reps - 30 hold  Sitting Heel Slide with Towel - 2 x daily - 7 x weekly - 10 reps - 1 sets - 10 hold  Standing Hip Abduction with Counter Support - 1 x daily - 7 x weekly - 3 sets - 10 reps  Standing March - 1 x daily - 7 x weekly - 3 sets - 10 reps  Sitting Knee Extension with Resistance - 1 x daily - 7 x weekly - 3 sets - 10 reps  Standing Hamstring Curl with Resistance - 1 x daily - 7 x weekly - 3 sets - 10 reps  Standing Heel Raise - 1 x daily - 7 x weekly - 3 sets - 10 reps    Manual Treatments:  PROM / STM / Oscillations-Mobs:  G-I, II, III, IV (PA's, Inf., Post.)  [x] (02151) Provided manual therapy to mobilize LE, proximal hip and/or LS spine soft tissue/joints for the purpose of modulating pain, promoting relaxation,  increasing ROM, reducing/eliminating soft tissue swelling/inflammation/restriction, improving soft tissue extensibility and allowing for proper ROM for normal function with self care, mobility, lifting and ambulation. Instrument Assisted Soft Tissue Mobilization (IASTM): was applied to the following muscles: L ITB   with Hawk  tools including HG2 (handle-bar), HG4 (small can-opener), HG5 (medium can-opener), HG 8 (biscotti), and HG9 (tongue depressor). Treatment consisted of IASTM strokes including sweeping, fanning, brushing, strumming, filleting, pinning and framing, based on body region contours, nature of the soft tissue restriction and desired treatment outcomes.  These techniques were used to restore neurophysiology, improve mechanotransduction, enhance fluid dynamics and break collagen crosslinks. The treatment area was exposed and the patient was draped in an appropriate manner. Upon completion the clinician cleaned the IASTM tools as per [facility] recommendations. Skin check pre:   Skin check post:   Intermittent tx time: 10'    Modalities: Will ice at home   [] GAME READY (VASO)- for significant edema, swelling, pain control. Charges:  Timed Code Treatment Minutes: 42   Total Treatment Minutes: 45     [] EVAL (LOW) 50694   [] EVAL (MOD) 21251   [] EVAL (HIGH) 84509   [] RE-EVAL   [x] CW(19219) x   1  [] IONTO  [x] NMR (38988) x  1   [] VASO  [x] Manual (91449) x  1    [] Other:  [] TA x      [] Mech Traction (13711)  [] ES(attended) (50854)      [] ES (un) (50549):       GOALS: Patient stated goal: make L hip feel as good as R hip. Get back to walking and hiking      Therapist goals for Patient:  Short Term Goals: To be achieved in: 2 weeks  1. Independent in HEP and progression per patient tolerance, in order to prevent re-injury. []? Progressing: []? Met: []? Not Met: []? Adjusted   2. Patient will have a decrease in pain to facilitate improvement in movement, function, and ADLs as indicated by Functional Deficits. []? Progressing: []? Met: []? Not Met: []? Adjusted     Long Term Goals: To be achieved in: 8 weeks  1. Disability index score of 16% or less for the [facility] to assist with reaching prior level of function. []? Progressing: []? Met: []? Not Met: []? Adjusted  2. Patient will demonstrate increased L hip AROM to 100 flex, 40 abd to allow for proper joint functioning as indicated by patients Functional Deficits. []? Progressing: []? Met: []? Not Met: []? Adjusted  3. Patient will demonstrate an increase in L hip strength to 4/5 hip flex and abd and extn in LE to allow for proper functional mobility as indicated by patients Functional Deficits. []? Progressing: []? Met: []?  Not Met: []? Adjusted  4. Patient will return to full community ambulation including up and down steps without increased symptoms or restriction. []? Progressing: []? Met: []? Not Met: []? Adjusted  5. Pt will return to going on 1 mile walk without pain or limitation in L hip.   []? Progressing: []? Met: []? Not Met: []? Adjusted           Overall Progression Towards Functional goals/ Treatment Progress Update:  [] Patient is progressing as expected towards functional goals listed. [] Progression is slowed due to complexities/Impairments listed. [] Progression has been slowed due to co-morbidities. [x] Plan just implemented, too soon to assess goals progression <30days   [] Goals require adjustment due to lack of progress  [] Patient is not progressing as expected and requires additional follow up with physician  [] Other    Prognosis for POC: [x] Good [] Fair  [] Poor      Patient requires continued skilled intervention: [x] Yes  [] No      ASSESSMENT:  See eval    Treatment/Activity Tolerance:  [x] Patient tolerated treatment well [] Patient limited by fatique  [] Patient limited by pain  [] Patient limited by other medical complications  [] Other: pt tolerated IASTM today for moderate pressure. He was able to progress with abd sidelying and clamshells but does note some fatigue so did not add to HEP for now, continue to progress as tolerated.        Return to Play: (if applicable)   []  Stage 1: Intro to Strength   []  Stage 2: Return to Run and Strength   []  Stage 3: Return to Jump and Strength   []  Stage 4: Dynamic Strength and Agility   []  Stage 5: Sport Specific Training     []  Ready to Return to Play, Meets All Above Stages   []  Not Ready for Return to Sports   Comments:            Prognosis: [x] Good [] Fair  [] Poor    Patient Requires Follow-up: [x] Yes  [] No    PLAN: See eval; consider squats, bike, glute set, BS, clams; continue stick massage  [] Continue per plan of care [] Alter current plan (see comments above)  [x] Plan of care initiated [] Hold pending MD visit [] Discharge    Note: If patient does not return for scheduled/ recommended follow up visits, this note will serve as a discharge from care along with most recent update on progress.      Electronically signed by: Carlos Beverly, PT PT, DPT      Carlos Beverly, PT, DPT, Cert DN

## 2021-10-14 ENCOUNTER — HOSPITAL ENCOUNTER (OUTPATIENT)
Dept: PHYSICAL THERAPY | Age: 56
Setting detail: THERAPIES SERIES
Discharge: HOME | End: 2021-10-14
Payer: COMMERCIAL

## 2021-10-14 PROCEDURE — 97112 NEUROMUSCULAR REEDUCATION: CPT

## 2021-10-14 PROCEDURE — 97140 MANUAL THERAPY 1/> REGIONS: CPT

## 2021-10-14 PROCEDURE — 97110 THERAPEUTIC EXERCISES: CPT

## 2021-10-14 NOTE — FLOWSHEET NOTE
[facility], [place]                                                         Physical Therapy Daily Treatment Note  Date:  10/14/2021    Patient Name:  Ashely Lemus    :  1965  MRN: [id number]    Medical/Treatment Diagnosis Information:  · Diagnosis: N92.019 (ICD-10-CM) - H/O total hip arthroplasty, left on 9/15/21  · Treatment Diagnosis: M25.552 (L) hip pain; M62.81 muscle weakness  Insurance/Certification information:  PT Insurance Information: [facility]- no auth  Physician Information:  Referring Practitioner: Dr. Claudean Harp, MD  Has the plan of care been signed (Y/N):        []  Yes  [x]  No     Date of Patient follow up with Physician: 10/28/21      Is this a Progress Report:     []  Yes  [x]  No        If Yes:  Date Range for reporting period:  Beginning- 10/5/2021  Ending    Progress report will be due (10 Rx or 30 days whichever is less): 10 visits or 66       Recertification will be due (POC Duration  / 90 days whichever is less): as above        Visit # Insurance Allowable Auth Required   4 19, used one priorly this year []  Yes [x]  No        Functional Scale: WOMAC- 28%    Date assessed:  10/5/2021     Latex Allergy:  [x]NO      []YES  Preferred Language for Healthcare:   [x]English       []other:    Pain level:  0/10  on 10/14/2021    SUBJECTIVE:  Pt is 4 week s/p. Pt states feeling pretty good from last session. Feeling a little achy at the lateral/anterior portion of the L hip. Pt still states feeling tight along the lateral aspect of the hip with a slight warm sensation surrounding lateral hip        OBJECTIVE: See eval   Observation:    Test measurements:    Joint mobility: n/t              []?Normal                       []?Hypo              []?Hyper     Palpation: effusion lateral thigh along IT band     Functional Mobility/Transfers: Indep     Posture:  WNL     Bandages/Dressings/Incisions: incision healing well, no signs/symptoms of infection 10/14/21      Gait: (include devices/WB status) decreased L hip extn ; arrived carrying cane in hand/not using     Single leg stance eyes open- R: 28 secs ;L: 8 secs sore lateral hip              SLS eyes closed- R: ; L:      Squats: mild weight shift to R LE                   ROM PROM AROM Comments     Left Right Left Right     Flexion     55 93     Extension             Abduction 20 32         Adduction             ER 35 30         IR 20 37                          Strength Left Right Comments   Hip flexors Not resisted due to stiffness with active motion 4+     Hip extension         Hip abduction         Hip adduction         Hip ER         Hip IR         Quads 4  5     Hamstrings 4 5        Flexibility Left Right Comments   Hamstrings 50 70 90/90 position   ITB (Obers test)         Hip flexor(Navjot test)         gastroc Fair-  fair     Rectus femoris(Elys test)     Heel to buttock distance                    RESTRICTIONS/PRECAUTIONS: none    Exercises/Interventions:     Exercise/Equipment Resistance Repetitions Other comments   bike      Stretching      Hamstring  3 x 30 secs seated    Hip Flexor      ITB- standing   [id number] gentle     Grion      Quad      Inclined Calf  3 x 30 secs runners    Towel Pull      Piriformis- figure 4      Knee to opposite shoulder                  SLR      Hip flexion      Hip extension      Hip Abduction  3 x 8  2#cuff weight   Standing hip marches     LAQ     Standing HS curl           Hip Adducton      SLR+      clamshells  3x5 Green resistance band; VC for controlling motion   Isometrics      Quad sets      Ball Squeezes      Patellar Glides      Medial      Superior      Inferior            ROM      Working on hip ROM   Active      Weight Shift      Hang Weights      Sheet Pulls      Ankle Pumps            CKC      Calf raises  3 x 10     Wall sits      Step ups      1 leg stand      Step touch down   3 x 8; 4\" step Cues for control descent with small motion   Squatting  3x10 to a chair with airex Cues to perform with hip flexion prior to knee    Single leg dead-lift      Isometric hip abduction into wall      Hip hikes      CC TKE      rockerboard  [id number] mini squat hold     Balance      Monster Walks      Bridging  3x5    Triple threats      Stool Scoots      PRE      Extension   RANGE:   Flexion  3x10 SL ECL 30#  RANGE:         Cable Column            Leg Press   RANGE:         Bike      Treadmill            Manual treatment          IASTM  See below        Patient education: Pt was educated on PT diagnosis, prognosis, and plan of care. Pt was educated on the use of ice throughout the day. Reviewed insurance benefits for physical therapy in an outpatient hospital based setting with the patient, including deductible of met and allowable visit number. Pt was informed of possible out of pocket costs      Therapeutic Exercise and NMR EXR  [x] (75950) Provided verbal/tactile cueing for activities related to strengthening, flexibility, endurance, ROM for improvements in LE, proximal hip, and core control with self care, mobility, lifting, ambulation.  [] (47929) Provided verbal/tactile cueing for activities related to improving balance, coordination, kinesthetic sense, posture, motor skill, proprioception  to assist with LE, proximal hip, and core control in self care, mobility, lifting, ambulation and eccentric single leg control.      NMR and Therapeutic Activities:    [x] (33897 or 37199) Provided verbal/tactile cueing for activities related to improving balance, coordination, kinesthetic sense, posture, motor skill, proprioception and motor activation to allow for proper function of core, proximal hip and LE with self care and ADLs  [] (17001) Gait Re-education- Provided training and instruction to the patient for proper LE, core and proximal hip recruitment and positioning and eccentric body weight control with ambulation re-education including up and down stairs     Home Exercise Program:    [x] (65766) Reviewed/Progressed HEP activities related to strengthening, flexibility, endurance, ROM of core, proximal hip and LE for functional self-care, mobility, lifting and ambulation/stair navigation   [] (73243)Reviewed/Progressed HEP activities related to improving balance, coordination, kinesthetic sense, posture, motor skill, proprioception of core, proximal hip and LE for self care, mobility, lifting, and ambulation/stair navigation    Access Code: [email or web address]  URL: [email or web address]  Date: 10/14/2021  Prepared by: Pastor Machuca    Exercises  Seated Table Hamstring Stretch - 2 x daily - 7 x weekly - 5 reps - 1 sets - 30 hold  Standing Gastroc Stretch - 2 x daily - 7 x weekly - 1 sets - 5 reps - 30 hold  Standing ITB Stretch - 1 x daily - 7 x weekly - 1 sets - 3-5 reps - 30 hold  Sitting Heel Slide with Towel - 2 x daily - 7 x weekly - 10 reps - 1 sets - 10 hold  Beginner Bridge - 1 x daily - 7 x weekly - 3 sets - 5-8 reps  Standing Hip Abduction with Counter Support - 1 x daily - 7 x weekly - 3 sets - 10 reps  Standing March - 1 x daily - 7 x weekly - 3 sets - 10 reps  Sitting Knee Extension with Resistance - 1 x daily - 7 x weekly - 3 sets - 10 reps  Standing Hamstring Curl with Resistance - 1 x daily - 7 x weekly - 3 sets - 10 reps  Standing Heel Raise - 1 x daily - 7 x weekly - 3 sets - 10 reps  Single Leg Stance with Support - 1 x daily - 7 x weekly - 1 sets - 3 reps - 30 hold  Clamshell - 1 x daily - 7 x weekly - 3 sets - 10 reps  Sidelying Hip Abduction - 1 x daily - 7 x weekly - 3 sets - 10 reps      Manual Treatments:  PROM / STM / Oscillations-Mobs:  G-I, II, III, IV (PA's, Inf., Post.)  [x] (93418) Provided manual therapy to mobilize LE, proximal hip and/or LS spine soft tissue/joints for the purpose of modulating pain, promoting relaxation,  increasing ROM, reducing/eliminating soft tissue swelling/inflammation/restriction, improving soft tissue extensibility and allowing for proper ROM for normal function with self care, mobility, lifting and ambulation. Instrument Assisted Soft Tissue Mobilization (IASTM): was applied to the following muscles: L ITB   with Hawk  tools including HG2 (handle-bar), HG4 (small can-opener), HG5 (medium can-opener), HG 8 (biscotti), and HG9 (tongue depressor). Treatment consisted of IASTM strokes including sweeping, fanning, brushing, strumming, filleting, pinning and framing, based on body region contours, nature of the soft tissue restriction and desired treatment outcomes. These techniques were used to restore neurophysiology, improve mechanotransduction, enhance fluid dynamics and break collagen crosslinks. The treatment area was exposed and the patient was draped in an appropriate manner. Upon completion the clinician cleaned the IASTM tools as per [facility] recommendations. Skin check pre:   Skin check post:   Intermittent tx time: 8'    Modalities: Will ice at home   [] GAME READY (VASO)- for significant edema, swelling, pain control. Charges:  Timed Code Treatment Minutes: 50   Total Treatment Minutes: 54     [] EVAL (LOW) 77265   [] EVAL (MOD) 96455   [] EVAL (HIGH) 60573   [] RE-EVAL   [x] YE(06072) x   1  [] IONTO  [x] NMR (52648) x  1   [] VASO  [x] Manual (62130) x  1    [] Other:  [] TA x      [] Mech Traction (31293)  [] ES(attended) (96459)      [] ES (un) (05379):       GOALS: Patient stated goal: make L hip feel as good as R hip. Get back to walking and hiking      Therapist goals for Patient:  Short Term Goals: To be achieved in: 2 weeks  1. Independent in HEP and progression per patient tolerance, in order to prevent re-injury. []? Progressing: []? Met: []? Not Met: []? Adjusted   2. Patient will have a decrease in pain to facilitate improvement in movement, function, and ADLs as indicated by Functional Deficits.     []? Progressing: []? Met: []? Not Met: []? Adjusted     Long Term Goals: To be achieved in: 8 weeks  1. Disability index score of 16% or less for the [facility] to assist with reaching prior level of function. []? Progressing: []? Met: []? Not Met: []? Adjusted  2. Patient will demonstrate increased L hip AROM to 100 flex, 40 abd to allow for proper joint functioning as indicated by patients Functional Deficits. []? Progressing: []? Met: []? Not Met: []? Adjusted  3. Patient will demonstrate an increase in L hip strength to 4/5 hip flex and abd and extn in LE to allow for proper functional mobility as indicated by patients Functional Deficits. []? Progressing: []? Met: []? Not Met: []? Adjusted  4. Patient will return to full community ambulation including up and down steps without increased symptoms or restriction. []? Progressing: []? Met: []? Not Met: []? Adjusted  5. Pt will return to going on 1 mile walk without pain or limitation in L hip.   []? Progressing: []? Met: []? Not Met: []? Adjusted           Overall Progression Towards Functional goals/ Treatment Progress Update:  [] Patient is progressing as expected towards functional goals listed. [] Progression is slowed due to complexities/Impairments listed. [] Progression has been slowed due to co-morbidities.   [x] Plan just implemented, too soon to assess goals progression <30days   [] Goals require adjustment due to lack of progress  [] Patient is not progressing as expected and requires additional follow up with physician  [] Other    Prognosis for POC: [x] Good [] Fair  [] Poor      Patient requires continued skilled intervention: [x] Yes  [] No      ASSESSMENT:  See eval    Treatment/Activity Tolerance:  [x] Patient tolerated treatment well [] Patient limited by fatique  [] Patient limited by pain  [] Patient limited by other medical complications  [] Other: Pt progressed with new step touch down exercise for increased challenge in functional position. pt tolerated IASTM today with moderate pressure. Added SL hip ABD and clamshells to HEP for continued hip strengthening. New step touch down not added to HEP due to continued use in therapy for progression prior to completing on standard step at home. Continue to progress as tolerated. Return to Play: (if applicable)   []  Stage 1: Intro to Strength   []  Stage 2: Return to Run and Strength   []  Stage 3: Return to Jump and Strength   []  Stage 4: Dynamic Strength and Agility   []  Stage 5: Sport Specific Training     []  Ready to Return to Play, Meets All Above Stages   []  Not Ready for Return to Sports   Comments:            Prognosis: [x] Good [] Fair  [] Poor    Patient Requires Follow-up: [x] Yes  [] No    PLAN: See eval; consider squats, bike,  [] Continue per plan of care [] Alter current plan (see comments above)  [x] Plan of care initiated [] Hold pending MD visit [] Discharge    Note: If patient does not return for scheduled/ recommended follow up visits, this note will serve as a discharge from care along with most recent update on progress. Electronically signed by: Shanita Reyes, PT PT, DPT  FRIDA Curiel  Therapist was present, directed the patient's care, made skilled judgement, and was responsible for assessment and treatment of the patient. Patient was in agreement to be treated by student physical therapist with licensed physical therapist present.        Shanita Reyes, PT, DPT, Chelsy NICHOLS

## 2021-10-15 ENCOUNTER — CLINICAL DOCUMENTATION (OUTPATIENT)
Dept: OTHER | Age: 56
End: 2021-10-15

## 2021-10-19 ENCOUNTER — HOSPITAL ENCOUNTER (OUTPATIENT)
Dept: PHYSICAL THERAPY | Age: 56
Setting detail: THERAPIES SERIES
Discharge: HOME | End: 2021-10-19
Payer: COMMERCIAL

## 2021-10-19 PROCEDURE — 97112 NEUROMUSCULAR REEDUCATION: CPT | Performed by: PHYSICAL THERAPIST

## 2021-10-19 PROCEDURE — 97140 MANUAL THERAPY 1/> REGIONS: CPT | Performed by: PHYSICAL THERAPIST

## 2021-10-19 PROCEDURE — 97110 THERAPEUTIC EXERCISES: CPT | Performed by: PHYSICAL THERAPIST

## 2021-10-19 NOTE — FLOWSHEET NOTE
[address] [facility]  [facility], [place]                                                         Physical Therapy Daily Treatment Note  Date:  10/19/2021    Patient Name:  Tania Rosario    :  1965  MRN: [id number]    Medical/Treatment Diagnosis Information:  · Diagnosis: X24.176 (ICD-10-CM) - H/O total hip arthroplasty, left on 9/15/21  · Treatment Diagnosis: M25.552 (L) hip pain; M62.81 muscle weakness  Insurance/Certification information:  PT Insurance Information: [facility]- no auth  Physician Information:  Referring Practitioner: Dr. Mahendra Franklin MD  Has the plan of care been signed (Y/N):        [x]  Yes  []  No     Date of Patient follow up with Physician: 10/28/21      Is this a Progress Report:     []  Yes  [x]  No        If Yes:  Date Range for reporting period:  Beginning- 10/5/2021  Ending    Progress report will be due (10 Rx or 30 days whichever is less): 10 visits or 81       Recertification will be due (POC Duration  / 90 days whichever is less): as above        Visit # Insurance Allowable Auth Required   , used one priorly this year []  Yes [x]  No        Functional Scale: WOMAC- 28%    Date assessed:  10/5/2021     Latex Allergy:  [x]NO      []YES  Preferred Language for Healthcare:   [x]English       []other:    Pain level:  0/10  on 10/19/2021    SUBJECTIVE:  Pt is 4.5 week s/p. Pt is feeling good. He says he was challenged by progressions last session    OBJECTIVE: See eval   Observation:    Test measurements:    Joint mobility: n/t              []?Normal                       []?Hypo              []?Hyper     Palpation: effusion lateral thigh along IT band     Functional Mobility/Transfers: Indep     Posture:  WNL     Bandages/Dressings/Incisions: incision healing well, no signs/symptoms of infection 10/14/21      Gait: (include devices/WB status) decreased L hip extn ; arrived carrying cane in hand/not Single leg dead-lift      Isometric hip abduction into wall      Hip hikes      CC TKE      rockerboard  [id number] mini squat hold     Balance      Monster Walks      Bridging  3x5 hold 3 secs     Triple threats      Stool Scoots      PRE      Extension  3 x 10 40 lbs DL RANGE:   Flexion  3x10 SL ECL 40#  RANGE:         Cable Column            Leg Press  3 x 10 90 lbs DL- said may increase npv RANGE:         Bike  X 5 mins    Treadmill            Manual treatment      Myofascial stick massage To IT band and lateral quad x 8 mins pt sidelying with pillow between legs     IASTM          Patient education: Pt was educated on PT diagnosis, prognosis, and plan of care. Pt was educated on the use of ice throughout the day. Reviewed insurance benefits for physical therapy in an outpatient hospital based setting with the patient, including deductible of met and allowable visit number. Pt was informed of possible out of pocket costs      Therapeutic Exercise and NMR EXR  [x] (26729) Provided verbal/tactile cueing for activities related to strengthening, flexibility, endurance, ROM for improvements in LE, proximal hip, and core control with self care, mobility, lifting, ambulation.  [] (22167) Provided verbal/tactile cueing for activities related to improving balance, coordination, kinesthetic sense, posture, motor skill, proprioception  to assist with LE, proximal hip, and core control in self care, mobility, lifting, ambulation and eccentric single leg control.      NMR and Therapeutic Activities:    [x] (96563 or 16807) Provided verbal/tactile cueing for activities related to improving balance, coordination, kinesthetic sense, posture, motor skill, proprioception and motor activation to allow for proper function of core, proximal hip and LE with self care and ADLs  [] (18431) Gait Re-education- Provided training and instruction to the patient for proper LE, core and proximal hip recruitment and positioning and eccentric soft tissue swelling/inflammation/restriction, improving soft tissue extensibility and allowing for proper ROM for normal function with self care, mobility, lifting and ambulation. Modalities: Will ice at home   [] GAME READY (VASO)- for significant edema, swelling, pain control. Charges:  Timed Code Treatment Minutes: 46   Total Treatment Minutes: 51     [] EVAL (LOW) 35856   [] EVAL (MOD) 67898   [] EVAL (HIGH) 61616   [] RE-EVAL   [x] LV(78083) x   1  [] IONTO  [x] NMR (83959) x  1   [] VASO  [x] Manual (09483) x  1    [] Other:  [] TA x      [] Mech Traction (37962)  [] ES(attended) (40643)      [] ES (un) (63261):       GOALS: Patient stated goal: make L hip feel as good as R hip. Get back to walking and hiking      Therapist goals for Patient:  Short Term Goals: To be achieved in: 2 weeks  1. Independent in HEP and progression per patient tolerance, in order to prevent re-injury. []? Progressing: []? Met: []? Not Met: []? Adjusted   2. Patient will have a decrease in pain to facilitate improvement in movement, function, and ADLs as indicated by Functional Deficits. []? Progressing: []? Met: []? Not Met: []? Adjusted     Long Term Goals: To be achieved in: 8 weeks  1. Disability index score of 16% or less for the [facility] to assist with reaching prior level of function. []? Progressing: []? Met: []? Not Met: []? Adjusted  2. Patient will demonstrate increased L hip AROM to 100 flex, 40 abd to allow for proper joint functioning as indicated by patients Functional Deficits. []? Progressing: []? Met: []? Not Met: []? Adjusted  3. Patient will demonstrate an increase in L hip strength to 4/5 hip flex and abd and extn in LE to allow for proper functional mobility as indicated by patients Functional Deficits. []? Progressing: []? Met: []? Not Met: []? Adjusted  4. Patient will return to full community ambulation including up and down steps without increased symptoms or restriction.    []? Progressing: []? Met: []? Not Met: []? Adjusted  5. Pt will return to going on 1 mile walk without pain or limitation in L hip.   []? Progressing: []? Met: []? Not Met: []? Adjusted           Overall Progression Towards Functional goals/ Treatment Progress Update:  [] Patient is progressing as expected towards functional goals listed. [] Progression is slowed due to complexities/Impairments listed. [] Progression has been slowed due to co-morbidities. [x] Plan just implemented, too soon to assess goals progression <30days   [] Goals require adjustment due to lack of progress  [] Patient is not progressing as expected and requires additional follow up with physician  [] Other    Prognosis for POC: [x] Good [] Fair  [] Poor      Patient requires continued skilled intervention: [x] Yes  [] No      ASSESSMENT:  See eval    Treatment/Activity Tolerance:  [x] Patient tolerated treatment well [] Patient limited by fatique  [] Patient limited by pain  [] Patient limited by other medical complications  [x] Other: continued with stick massage to IT band and lateral quad for continued reports of tightness here. He did request doing myofascial stick instead of IASTM today. He was progressed to PRE leg press and knee extn. He was fatigued in quads after this and continued to be challenged by lateral heel taps and squats in session. He did have good form with SL balance maintaining level pelvis and says this is getting easier. Pt will continue to benefit from skilled PT to progress LE strength, stability, and balance to improve functional mobility.        Return to Play: (if applicable)   []  Stage 1: Intro to Strength   []  Stage 2: Return to Run and Strength   []  Stage 3: Return to Jump and Strength   []  Stage 4: Dynamic Strength and Agility   []  Stage 5: Sport Specific Training     []  Ready to Return to Play, Meets All Above Stages   []  Not Ready for Return to Sports   Comments:            Prognosis: [x] Good []

## 2021-10-22 ENCOUNTER — HOSPITAL ENCOUNTER (OUTPATIENT)
Dept: PHYSICAL THERAPY | Age: 56
Setting detail: THERAPIES SERIES
Discharge: HOME | End: 2021-10-22
Payer: COMMERCIAL

## 2021-10-22 PROCEDURE — 97530 THERAPEUTIC ACTIVITIES: CPT | Performed by: PHYSICAL THERAPIST

## 2021-10-22 PROCEDURE — 97110 THERAPEUTIC EXERCISES: CPT | Performed by: PHYSICAL THERAPIST

## 2021-10-22 PROCEDURE — 97112 NEUROMUSCULAR REEDUCATION: CPT | Performed by: PHYSICAL THERAPIST

## 2021-10-22 NOTE — FLOWSHEET NOTE
[address] [facility], [place]                                                         Physical Therapy Daily Treatment Note  Date:  10/22/2021    Patient Name:  Lori Runkelsea    :  1965  MRN: [id number]    Medical/Treatment Diagnosis Information:  · Diagnosis: U15.382 (ICD-10-CM) - H/O total hip arthroplasty, left on 9/15/21  · Treatment Diagnosis: M25.552 (L) hip pain; M62.81 muscle weakness  Insurance/Certification information:  PT Insurance Information: [facility]- no auth  Physician Information:  Referring Practitioner: Dr. Myrna Johns MD  Has the plan of care been signed (Y/N):        [x]  Yes  []  No     Date of Patient follow up with Physician: 10/28/21      Is this a Progress Report:     []  Yes  [x]  No        If Yes:  Date Range for reporting period:  Beginning- 10/5/2021  Ending    Progress report will be due (10 Rx or 30 days whichever is less): 10 visits or 15/1/18       Recertification will be due (POC Duration  / 90 days whichever is less): as above        Visit # Insurance Allowable Auth Required   , used one priorly this year []  Yes [x]  No        Functional Scale: WOMAC- 28%    Date assessed:  10/5/2021     Latex Allergy:  [x]NO      []YES  Preferred Language for Healthcare:   [x]English       []other:    Pain level:  0/10  on 10/22/2021    SUBJECTIVE:  Pt is 5 week s/p. Pt felt fine after last session. He is feeling good today. He has been working on lateral heel taps at home. He also walked . 5 miles last night on a slight grade down his road last night. OBJECTIVE: See eval   Observation:    Test measurements:    Joint mobility: n/t              []?Normal                       []?Hypo              []?Hyper     Palpation: effusion lateral thigh along IT band     Functional Mobility/Transfers: Indep     Posture:  WNL     Bandages/Dressings/Incisions: incision healing well, no signs/symptoms of infection 10/14/21      Gait: (include devices/WB status) decreased L hip extn ; arrived carrying cane in hand/not using     Single leg stance eyes open- R: 28 secs ;L: 8 secs sore lateral hip              SLS eyes closed- R: ; L:      Squats: mild weight shift to R LE                   ROM PROM AROM Comments     Left Right Left Right     Flexion     55 93     Extension             Abduction 20 32         Adduction             ER 35 30         IR 20 37                          Strength Left Right Comments   Hip flexors Not resisted due to stiffness with active motion 4+     Hip extension         Hip abduction         Hip adduction         Hip ER         Hip IR         Quads 4  5     Hamstrings 4 5        Flexibility Left Right Comments   Hamstrings 50 70 90/90 position   ITB (Obers test)         Hip flexor(Navjot test)         gastroc Fair-  fair     Rectus femoris(Elys test)     Heel to buttock distance                    RESTRICTIONS/PRECAUTIONS: none    Exercises/Interventions:     Exercise/Equipment Resistance Repetitions Other comments   bike      Stretching      Hamstring  3 x 30 secs seated    Hip Flexor      ITB- standing   [id number] gentle     Grion      Quad      Inclined Calf  3 x 30 secs  runners in HEP   Towel Pull      Piriformis- figure 4      Knee to opposite shoulder                  SLR      Hip flexion      Hip extension      Hip Abduction  3 x 10  4#cuff weight   Standing hip marches     LAQ     Standing HS curl           Hip Adducton      SLR+      clamshells  3x10  Green resistance band; VC for controlling motion   Isometrics      Quad sets      Ball Squeezes      Patellar Glides      Medial      Superior      Inferior            ROM      Working on hip ROM   Active      Weight Shift      Hang Weights      Sheet Pulls      Ankle Pumps            CKC      Calf raises  3 x 10     Wall sits  3 x 25, 29,31  secs    Step ups      1 leg stand  [id number] holding other LE into high march position    Step touch down   3 x 10; 4\" step with bolster in front of knee for cues to avoid knee in front of toes Cues for control descent with small motion and to avoid knee valgus   Squatting   Cues to perform with hip flexion prior to knee    Single leg dead-lift      Isometric hip abduction into wall      Hip hikes      CC TKE      rockerboard      Balance      Lateral band walks  4 half laps green band    Monster Walks      Bridging  3x5 hold 3 secs with green band    Triple threats      Stool Scoots      PRE      Extension  3 x 10 50 lbs DL RANGE:   Flexion  3x10 SL ECL 40#  RANGE:         Cable Column            Leg Press  3 x 10 100 lbs DL-  RANGE:         Bike  X 5 mins    Treadmill            Manual treatment      Myofascial stick massage To IT band and lateral quad x 5 mins pt sidelying with pillow between legs  Requested to do at end of tx   IAS          Patient education: Pt was educated on PT diagnosis, prognosis, and plan of care. Pt was educated on the use of ice throughout the day. Reviewed insurance benefits for physical therapy in an outpatient hospital based setting with the patient, including deductible of met and allowable visit number. Pt was informed of possible out of pocket costs      Therapeutic Exercise and NMR EXR  [x] (61757) Provided verbal/tactile cueing for activities related to strengthening, flexibility, endurance, ROM for improvements in LE, proximal hip, and core control with self care, mobility, lifting, ambulation.  [] (14568) Provided verbal/tactile cueing for activities related to improving balance, coordination, kinesthetic sense, posture, motor skill, proprioception  to assist with LE, proximal hip, and core control in self care, mobility, lifting, ambulation and eccentric single leg control.      NMR and Therapeutic Activities:    [x] (62158 or 55730) Provided verbal/tactile cueing for activities related to improving balance, coordination, kinesthetic sense, posture, motor skill, proprioception and motor activation to allow for proper function of core, proximal hip and LE with self care and ADLs  [] (68727) Gait Re-education- Provided training and instruction to the patient for proper LE, core and proximal hip recruitment and positioning and eccentric body weight control with ambulation re-education including up and down stairs     Home Exercise Program:    [x] (50647) Reviewed/Progressed HEP activities related to strengthening, flexibility, endurance, ROM of core, proximal hip and LE for functional self-care, mobility, lifting and ambulation/stair navigation   [] (16386)Reviewed/Progressed HEP activities related to improving balance, coordination, kinesthetic sense, posture, motor skill, proprioception of core, proximal hip and LE for self care, mobility, lifting, and ambulation/stair navigation    Access Code: [email or web address]  URL: [email or web address]  Date: 10/14/2021  Prepared by: Nellie Asif    Exercises  Seated Table Hamstring Stretch - 2 x daily - 7 x weekly - 5 reps - 1 sets - 30 hold  Standing Gastroc Stretch - 2 x daily - 7 x weekly - 1 sets - 5 reps - 30 hold  Standing ITB Stretch - 1 x daily - 7 x weekly - 1 sets - 3-5 reps - 30 hold  Sitting Heel Slide with Towel - 2 x daily - 7 x weekly - 10 reps - 1 sets - 10 hold  Beginner Bridge - 1 x daily - 7 x weekly - 3 sets - 5-8 reps  Standing Hip Abduction with Counter Support - 1 x daily - 7 x weekly - 3 sets - 10 reps  Standing March - 1 x daily - 7 x weekly - 3 sets - 10 reps  Sitting Knee Extension with Resistance - 1 x daily - 7 x weekly - 3 sets - 10 reps  Standing Hamstring Curl with Resistance - 1 x daily - 7 x weekly - 3 sets - 10 reps  Standing Heel Raise - 1 x daily - 7 x weekly - 3 sets - 10 reps  Single Leg Stance with Support - 1 x daily - 7 x weekly - 1 sets - 3 reps - 30 hold  Clamshell - 1 x daily - 7 x weekly - 3 sets - 10 reps  Sidelying Hip Abduction - 1 x daily - 7 x weekly - 3 sets - 10 reps      Manual Treatments:  PROM / STM / Oscillations-Mobs:  G-I, II, III, IV (PA's, Inf., Post.)  [x] (51491) Provided manual therapy to mobilize LE, proximal hip and/or LS spine soft tissue/joints for the purpose of modulating pain, promoting relaxation,  increasing ROM, reducing/eliminating soft tissue swelling/inflammation/restriction, improving soft tissue extensibility and allowing for proper ROM for normal function with self care, mobility, lifting and ambulation. Modalities: Will ice at home   [] GAME READY (VASO)- for significant edema, swelling, pain control. Charges:  Timed Code Treatment Minutes: 44   Total Treatment Minutes: 48     [] EVAL (LOW) 67606   [] EVAL (MOD) 09866   [] EVAL (HIGH) 43145   [] RE-EVAL   [x] VO(91478) x   1  [] IONTO  [x] NMR (55162) x  1   [] VASO  [] Manual (00383) x      [] Other:  [x] TA x1      [] Mech Traction (33049)  [] ES(attended) (18222)      [] ES (un) (32871):       GOALS: Patient stated goal: make L hip feel as good as R hip. Get back to walking and hiking      Therapist goals for Patient:  Short Term Goals: To be achieved in: 2 weeks  1. Independent in HEP and progression per patient tolerance, in order to prevent re-injury. []? Progressing: []? Met: []? Not Met: []? Adjusted   2. Patient will have a decrease in pain to facilitate improvement in movement, function, and ADLs as indicated by Functional Deficits. []? Progressing: []? Met: []? Not Met: []? Adjusted     Long Term Goals: To be achieved in: 8 weeks  1. Disability index score of 16% or less for the [facility] to assist with reaching prior level of function. []? Progressing: []? Met: []? Not Met: []? Adjusted  2. Patient will demonstrate increased L hip AROM to 100 flex, 40 abd to allow for proper joint functioning as indicated by patients Functional Deficits. []? Progressing: []? Met: []? Not Met: []? Adjusted  3.  Patient will demonstrate an increase in L hip strength to 4/5 hip flex and abd and extn in LE to allow for proper functional mobility as indicated by patients Functional Deficits. []? Progressing: []? Met: []? Not Met: []? Adjusted  4. Patient will return to full community ambulation including up and down steps without increased symptoms or restriction. []? Progressing: []? Met: []? Not Met: []? Adjusted  5. Pt will return to going on 1 mile walk without pain or limitation in L hip.   []? Progressing: []? Met: []? Not Met: []? Adjusted           Overall Progression Towards Functional goals/ Treatment Progress Update:  [] Patient is progressing as expected towards functional goals listed. [] Progression is slowed due to complexities/Impairments listed. [] Progression has been slowed due to co-morbidities. [x] Plan just implemented, too soon to assess goals progression <30days   [] Goals require adjustment due to lack of progress  [] Patient is not progressing as expected and requires additional follow up with physician  [] Other    Prognosis for POC: [x] Good [] Fair  [] Poor      Patient requires continued skilled intervention: [x] Yes  [] No      ASSESSMENT:  See eval    Treatment/Activity Tolerance:  [x] Patient tolerated treatment well [] Patient limited by fatique  [] Patient limited by pain  [] Patient limited by other medical complications  [x] Other: Pt did well in session today. He was able to make progressions in strengthening for LEs today and was challenged by these. He was fatigued in hip radha with SLR abd and working on balance and stability with lateral heel taps. He was fatigued in quads with wall sits. No pain throughout session. Pt will continue to benefit from skilled PT to progress LE strength, stability, and balance to improve functional mobility.        Return to Play: (if applicable)   []  Stage 1: Intro to Strength   []  Stage 2: Return to Run and Strength   []  Stage 3: Return to Jump and Strength   []  Stage 4: Dynamic Strength and Agility   []  Stage 5: Sport Specific Training     []  Ready to Return to Play, Meets All Above Stages   []  Not Ready for Return to Sports   Comments:            Prognosis: [x] Good [] Fair  [] Poor    Patient Requires Follow-up: [x] Yes  [] No    PLAN: See eval; consider SLS with opposite hip flex hold   [x] Continue per plan of care [] Alter current plan (see comments above)  [] Plan of care initiated [] Hold pending MD visit [] Discharge    Note: If patient does not return for scheduled/ recommended follow up visits, this note will serve as a discharge from care along with most recent update on progress.      Electronically signed by: Kasey William, PT PT, DPT

## 2021-10-26 ENCOUNTER — HOSPITAL ENCOUNTER (OUTPATIENT)
Dept: PHYSICAL THERAPY | Age: 56
Setting detail: THERAPIES SERIES
Discharge: HOME | End: 2021-10-26
Payer: COMMERCIAL

## 2021-10-26 PROCEDURE — 97530 THERAPEUTIC ACTIVITIES: CPT | Performed by: PHYSICAL THERAPIST

## 2021-10-26 PROCEDURE — 97110 THERAPEUTIC EXERCISES: CPT | Performed by: PHYSICAL THERAPIST

## 2021-10-26 PROCEDURE — 97112 NEUROMUSCULAR REEDUCATION: CPT | Performed by: PHYSICAL THERAPIST

## 2021-10-26 NOTE — FLOWSHEET NOTE
[facility], [place]                                                         Physical Therapy Daily Treatment Note  Date:  10/26/2021    Patient Name:  Sonia Navarro    :  1965  MRN: [id number]    Medical/Treatment Diagnosis Information:  · Diagnosis: B79.519 (ICD-10-CM) - H/O total hip arthroplasty, left on 9/15/21  · Treatment Diagnosis: M25.552 (L) hip pain; M62.81 muscle weakness  Insurance/Certification information:  PT Insurance Information: [facility]- no auth  Physician Information:  Referring Practitioner: Dr. Lillian MD  Has the plan of care been signed (Y/N):        [x]  Yes  []  No     Date of Patient follow up with Physician: 10/28/21      Is this a Progress Report:     []  Yes  [x]  No        If Yes:  Date Range for reporting period:  Beginning- 10/5/2021  Ending    Progress report will be due (10 Rx or 30 days whichever is less): 10 visits or        Recertification will be due (POC Duration  / 90 days whichever is less): as above        Visit # Insurance Allowable Auth Required   , used one priorly this year []  Yes [x]  No        Functional Scale: WOMAC- 28%    Date assessed:  10/5/2021     Latex Allergy:  [x]NO      []YES  Preferred Language for Healthcare:   [x]English       []other:    Pain level:  0/10  on 10/26/2021    SUBJECTIVE:  Pt is 5.5 week s/p. Pt is feeling good in hip with no pain. He did not feel too bad after last session. Brandy [place] sits are the hardest exercise. He did put on his own shoe this morning with putting foot up on a step. OBJECTIVE: See eval   Observation:    Test measurements:    Joint mobility: n/t              []?Normal                       []?Hypo              []?Hyper     Palpation: effusion lateral thigh along IT band     Functional Mobility/Transfers: Indep     Posture:  WNL     Bandages/Dressings/Incisions: incision healing well, no signs/symptoms of infection 10/14/21      Gait: (include devices/WB status) decreased L hip extn ; arrived carrying cane in hand/not using     Single leg stance eyes open- R: 28 secs ;L: 8 secs sore lateral hip              SLS eyes closed- R: ; L:      Squats: mild weight shift to R LE                   ROM PROM AROM Comments     Left Right Left Right     Flexion     55 93     Extension             Abduction 20 32         Adduction             ER 35 30         IR 20 37                          Strength Left Right Comments   Hip flexors Not resisted due to stiffness with active motion 4+     Hip extension         Hip abduction         Hip adduction         Hip ER         Hip IR         Quads 4  5     Hamstrings 4 5        Flexibility Left Right Comments   Hamstrings 50 70 90/90 position   ITB (Obers test)         Hip flexor(Navjot test)         gastroc Fair-  fair     Rectus femoris(Elys test)     Heel to buttock distance                    RESTRICTIONS/PRECAUTIONS: none    Exercises/Interventions:     Exercise/Equipment Resistance Repetitions Other comments   bike      Stretching      Hamstring  3 x 30 secs seated    Hip Flexor      ITB- standing   [id number] gentle     Grion      Quad      Inclined Calf  3 x 30 secs  runners in HEP   Towel Pull      Piriformis- figure 4      Knee to opposite shoulder                  SLR      Hip flexion      Hip extension      Hip Abduction  3 x 10  4#cuff weight   Standing hip marches     LAQ     Standing HS curl           Hip Adducton      SLR+      clamshells  3x10  Green resistance band; VC for controlling motion   Isometrics      Quad sets      Ball Squeezes      Patellar Glides      Medial      Superior      Inferior            ROM      Working on hip ROM   Active      Weight Shift      Hang Weights      Sheet Pulls      Ankle Pumps            CKC      Calf raises  3 x 10     Wall sits  3 x 30 secs    Step ups      1 leg stand  [id number] holding other LE into high march position    Step touch down   3 x 10; 4\" step with bolster in front of knee for cues to avoid knee in front of toes Cues for control descent with small motion and to avoid knee valgus   Squatting  3x10 to a chair with airex Cues to perform with hip flexion prior to knee and avoid knees over toes   Single leg dead-lift      Isometric hip abduction into wall      Hip hikes      CC TKE      rockerboard      Balance      Lateral band walks  4 half laps green band    Monster Walks      Bridging  3x5 hold 3 secs with green band    Triple threats      Stool Scoots      PRE      Extension  3 x 10 50 lbs DL RANGE:   Flexion  3x10 SL ECL 50#  RANGE:         Cable Column            Leg Press  3 x 10 120 lbs DL-  RANGE:         Bike  X 5 mins    Treadmill            Manual treatment      Myofascial stick massage To IT band and lateral quad x 5 mins pt sidelying with pillow between legs  Requested to do at end of tx   IASTM          Patient education: Pt was educated on PT diagnosis, prognosis, and plan of care. Pt was educated on the use of ice throughout the day. Reviewed insurance benefits for physical therapy in an outpatient hospital based setting with the patient, including deductible of met and allowable visit number. Pt was informed of possible out of pocket costs      Therapeutic Exercise and NMR EXR  [x] (47941) Provided verbal/tactile cueing for activities related to strengthening, flexibility, endurance, ROM for improvements in LE, proximal hip, and core control with self care, mobility, lifting, ambulation.  [] (93318) Provided verbal/tactile cueing for activities related to improving balance, coordination, kinesthetic sense, posture, motor skill, proprioception  to assist with LE, proximal hip, and core control in self care, mobility, lifting, ambulation and eccentric single leg control.      NMR and Therapeutic Activities:    [x] (21421 or 15510) Provided verbal/tactile cueing for activities related to improving balance, coordination, kinesthetic sense, posture, motor skill, proprioception and motor activation to allow for proper function of core, proximal hip and LE with self care and ADLs  [] (49237) Gait Re-education- Provided training and instruction to the patient for proper LE, core and proximal hip recruitment and positioning and eccentric body weight control with ambulation re-education including up and down stairs     Home Exercise Program:    [x] (15275) Reviewed/Progressed HEP activities related to strengthening, flexibility, endurance, ROM of core, proximal hip and LE for functional self-care, mobility, lifting and ambulation/stair navigation   [] (41040)Reviewed/Progressed HEP activities related to improving balance, coordination, kinesthetic sense, posture, motor skill, proprioception of core, proximal hip and LE for self care, mobility, lifting, and ambulation/stair navigation    Access Code: [email or web address]  URL: [email or web address]  Date: 10/14/2021  Prepared by: Pastor Machuca    Exercises  Seated Table Hamstring Stretch - 2 x daily - 7 x weekly - 5 reps - 1 sets - 30 hold  Standing Gastroc Stretch - 2 x daily - 7 x weekly - 1 sets - 5 reps - 30 hold  Standing ITB Stretch - 1 x daily - 7 x weekly - 1 sets - 3-5 reps - 30 hold  Sitting Heel Slide with Towel - 2 x daily - 7 x weekly - 10 reps - 1 sets - 10 hold  Beginner Bridge - 1 x daily - 7 x weekly - 3 sets - 5-8 reps  Standing Hip Abduction with Counter Support - 1 x daily - 7 x weekly - 3 sets - 10 reps  Standing March - 1 x daily - 7 x weekly - 3 sets - 10 reps  Sitting Knee Extension with Resistance - 1 x daily - 7 x weekly - 3 sets - 10 reps  Standing Hamstring Curl with Resistance - 1 x daily - 7 x weekly - 3 sets - 10 reps  Standing Heel Raise - 1 x daily - 7 x weekly - 3 sets - 10 reps  Single Leg Stance with Support - 1 x daily - 7 x weekly - 1 sets - 3 reps - 30 hold  Clamshell - 1 x daily - 7 x weekly - 3 sets - 10 reps  Sidelying Hip Abduction - 1 x daily - 7 x weekly - 3 sets - 10 reps      Manual Treatments:  PROM / STM / Oscillations-Mobs:  G-I, II, III, IV (PA's, Inf., Post.)  [x] (41564) Provided manual therapy to mobilize LE, proximal hip and/or LS spine soft tissue/joints for the purpose of modulating pain, promoting relaxation,  increasing ROM, reducing/eliminating soft tissue swelling/inflammation/restriction, improving soft tissue extensibility and allowing for proper ROM for normal function with self care, mobility, lifting and ambulation. Modalities: Will ice at home   [] GAME READY (VASO)- for significant edema, swelling, pain control. Charges:  Timed Code Treatment Minutes: 42   Total Treatment Minutes: 46     [] EVAL (LOW) 97319   [] EVAL (MOD) 51499   [] EVAL (HIGH) 35894   [] RE-EVAL   [x] SV(31169) x   1  [] IONTO  [x] NMR (88549) x  1   [] VASO  [] Manual (72503) x      [] Other:  [x] TA x1      [] Mech Traction (99988)  [] ES(attended) (04233)      [] ES (un) (18071):       GOALS: Patient stated goal: make L hip feel as good as R hip. Get back to walking and hiking      Therapist goals for Patient:  Short Term Goals: To be achieved in: 2 weeks  1. Independent in HEP and progression per patient tolerance, in order to prevent re-injury. []? Progressing: []? Met: []? Not Met: []? Adjusted   2. Patient will have a decrease in pain to facilitate improvement in movement, function, and ADLs as indicated by Functional Deficits. []? Progressing: []? Met: []? Not Met: []? Adjusted     Long Term Goals: To be achieved in: 8 weeks  1. Disability index score of 16% or less for the [facility] to assist with reaching prior level of function. []? Progressing: []? Met: []? Not Met: []? Adjusted  2. Patient will demonstrate increased L hip AROM to 100 flex, 40 abd to allow for proper joint functioning as indicated by patients Functional Deficits. []? Progressing: []? Met: []? Not Met: []? Adjusted  3.  Patient will demonstrate an increase in L hip strength to 4/5 hip flex and abd and extn in LE to allow for proper functional mobility as indicated by patients Functional Deficits. []? Progressing: []? Met: []? Not Met: []? Adjusted  4. Patient will return to full community ambulation including up and down steps without increased symptoms or restriction. []? Progressing: []? Met: []? Not Met: []? Adjusted  5. Pt will return to going on 1 mile walk without pain or limitation in L hip.   []? Progressing: []? Met: []? Not Met: []? Adjusted           Overall Progression Towards Functional goals/ Treatment Progress Update:  [] Patient is progressing as expected towards functional goals listed. [] Progression is slowed due to complexities/Impairments listed. [] Progression has been slowed due to co-morbidities. [x] Plan just implemented, too soon to assess goals progression <30days   [] Goals require adjustment due to lack of progress  [] Patient is not progressing as expected and requires additional follow up with physician  [] Other    Prognosis for POC: [x] Good [] Fair  [] Poor      Patient requires continued skilled intervention: [x] Yes  [] No      ASSESSMENT:  See eval    Treatment/Activity Tolerance:  [x] Patient tolerated treatment well [] Patient limited by fatique  [] Patient limited by pain  [] Patient limited by other medical complications  [x] Other: pt did well in session today. Did have pt hold opposite LE into more hip flex with SLS and this was more challenging for pt and improved glute activation. Also cued to bend at hips more and avoid knees over toes to get pt to use glutes more with squats. Pt was again fatigued with wall sits. Did give pt band to add to exercises at home. Pt will continue to benefit from skilled PT to progress LE strength, stability, and balance to improve functional mobility.        Return to Play: (if applicable)   []  Stage 1: Intro to Strength   []  Stage 2: Return to Run and Strength   []  Stage 3: Return to Jump and Strength   []  Stage 4: Dynamic Strength and Agility   []  Stage 5: Sport Specific Training     []  Ready to Return to Play, Meets All Above Stages   []  Not Ready for Return to Sports   Comments:            Prognosis: [x] Good [] Fair  [] Poor    Patient Requires Follow-up: [x] Yes  [] No    PLAN: See eval; consider SL PRE machines; continue with stick massage at end of session  [x] Continue per plan of care [] Alter current plan (see comments above)  [] Plan of care initiated [] Hold pending MD visit [] Discharge    Note: If patient does not return for scheduled/ recommended follow up visits, this note will serve as a discharge from care along with most recent update on progress.      Electronically signed by: Le Mancia, PT PT, DPT

## 2021-10-28 ENCOUNTER — OFFICE VISIT (OUTPATIENT)
Dept: ORTHOPEDIC SURGERY | Age: 56
End: 2021-10-28

## 2021-10-28 DIAGNOSIS — Z96.642 HISTORY OF TOTAL HIP ARTHROPLASTY, LEFT: Primary | ICD-10-CM

## 2021-10-28 PROCEDURE — 99024 POSTOP FOLLOW-UP VISIT: CPT | Performed by: PHYSICIAN ASSISTANT

## 2021-10-28 NOTE — PROGRESS NOTES
This dictation was done with Dragon dictation and may contain mechanical errors related to translation. There were no vitals taken for this visit. This is a very pleasant 14-year-old gentleman who is doing extremely well after a left total hip replacement. We discussed his good progress his incisions healing nicely without any signs of infection. At this visit the X-rays, presenting symptoms, and chief complaint were presented to Lei Johnson MD.  He then evaluated the patient. He spent several minutes discussing face to face with the patient the clinical diagnosis and medical course options,from conservative to aggressive including risks and benefits.     At this point he going to continue doing physical therapy per the protocol and follow-up with us in 5 to 6 weeks for repeat examination and x-rays

## 2021-10-29 ENCOUNTER — HOSPITAL ENCOUNTER (OUTPATIENT)
Dept: PHYSICAL THERAPY | Age: 56
Setting detail: THERAPIES SERIES
Discharge: HOME | End: 2021-10-29
Payer: COMMERCIAL

## 2021-10-29 PROCEDURE — 97112 NEUROMUSCULAR REEDUCATION: CPT | Performed by: PHYSICAL THERAPIST

## 2021-10-29 PROCEDURE — 97530 THERAPEUTIC ACTIVITIES: CPT | Performed by: PHYSICAL THERAPIST

## 2021-10-29 PROCEDURE — 97110 THERAPEUTIC EXERCISES: CPT | Performed by: PHYSICAL THERAPIST

## 2021-10-29 NOTE — FLOWSHEET NOTE
[facility], [facility]                                                         Physical Therapy Daily Treatment Note  Date:  10/29/2021    Patient Name:  Jason Hagan    :  1965  MRN: [id number]    Medical/Treatment Diagnosis Information:  · Diagnosis: R50.033 (ICD-10-CM) - H/O total hip arthroplasty, left on 9/15/21  · Treatment Diagnosis: M25.552 (L) hip pain; M62.81 muscle weakness  Insurance/Certification information:  PT Insurance Information: [facility]- no auth  Physician Information:  Referring Practitioner: Dr. Nohemy Francois MD  Has the plan of care been signed (Y/N):        [x]  Yes  []  No     Date of Patient follow up with Physician: 10/28/21      Is this a Progress Report:     []  Yes  [x]  No        If Yes:  Date Range for reporting period:  Beginning- 10/5/2021  Ending    Progress report will be due (10 Rx or 30 days whichever is less): 10 visits or 41       Recertification will be due (POC Duration  / 90 days whichever is less): as above        Visit # Insurance Allowable Auth Required   , used one priorly this year []  Yes [x]  No        Functional Scale: WOMAC- 28%    Date assessed:  10/5/2021     Latex Allergy:  [x]NO      []YES  Preferred Language for Healthcare:   [x]English       []other:    Pain level:  0/10  on 10/29/2021    SUBJECTIVE:  Pt is 6 week s/p. Pt was fatigued and shaky after last session but overall felt like he worked it good. No pain in hip. He saw PA and said was looking good and really no restriction at this point. OBJECTIVE: See eval   Observation:    Test measurements:    Joint mobility: n/t              []?Normal                       []?Hypo              []?Hyper     Palpation: effusion lateral thigh along IT band     Functional Mobility/Transfers: Indep     Posture:  WNL     Bandages/Dressings/Incisions: incision healing well, no signs/symptoms of infection 10/14/21      Gait: (include devices/WB status) decreased L hip extn ; arrived carrying cane in hand/not using     Single leg stance eyes open- R: 28 secs ;L: 8 secs sore lateral hip              SLS eyes closed- R: ; L:      Squats: mild weight shift to R LE                   ROM PROM AROM Comments     Left Right Left Right     Flexion     55 93     Extension             Abduction 20 32         Adduction             ER 35 30         IR 20 37                          Strength Left Right Comments   Hip flexors Not resisted due to stiffness with active motion 4+     Hip extension         Hip abduction         Hip adduction         Hip ER         Hip IR         Quads 4  5     Hamstrings 4 5        Flexibility Left Right Comments   Hamstrings 50 70 90/90 position   ITB (Obers test)         Hip flexor(Navjot test)         gastroc Fair-  fair     Rectus femoris(Elys test)     Heel to buttock distance                    RESTRICTIONS/PRECAUTIONS: none    Exercises/Interventions:     Exercise/Equipment Resistance Repetitions Other comments   bike      Stretching      Hamstring  3 x 30 secs seated    Hip Flexor      ITB- standing       Grion      Quad  3 x 30 secs prone    Inclined Calf  3 x 30 secs  runners in HEP   Towel Pull      Piriformis- figure 4      Knee to  shoulder  3 x 30 secs                SLR      Hip flexion      Hip extension Bent over table 2 x 10 hold 3 secs Cues for glute set hold at top   Hip Abduction  4#cuff weight   Standing hip marches     LAQ     Standing HS curl          Hip Adducton     SLR+     clamshells  Green resistance band; VC for controlling motion   Isometrics      Quad sets      Ball Squeezes      Patellar Glides      Medial      Superior      Inferior            ROM      Working on hip ROM   Active      Weight Shift      Hang Weights      Sheet Pulls      Ankle Pumps            CKC      Calf raises      Wall sits  3 x 30 secs    Step ups      1 leg stand     Step touch down   Cues for control descent with small motion and to avoid knee valgus   Squatting  Cues to perform with hip flexion prior to knee and avoid knees over toes   Single leg dead-lift  Ball Reaches to table with small hip hinge 2 x 10  Difficult for pt to maintain balance         Isometric hip abduction into wall  Foot on step stool 5 x 10 secs B    Hip hikes      CC TKE      rockerboard      Balance      Lateral band walks  4 half laps green band    [employer]      Bridging      Triple threats      Stool Scoots      PRE      Extension  3 x 10 30 lbs SL RANGE:   Flexion  3x10 SL 40 lbs  RANGE:         Cable Column            Leg Press  3 x 10 65 lbs SL-  RANGE:         Bike  X 5 mins    Treadmill            Manual treatment      Myofascial stick massage      IASTM          Patient education: Pt was educated on PT diagnosis, prognosis, and plan of care. Pt was educated on the use of ice throughout the day. Reviewed insurance benefits for physical therapy in an outpatient hospital based setting with the patient, including deductible of met and allowable visit number. Pt was informed of possible out of pocket costs      Therapeutic Exercise and NMR EXR  [x] (15532) Provided verbal/tactile cueing for activities related to strengthening, flexibility, endurance, ROM for improvements in LE, proximal hip, and core control with self care, mobility, lifting, ambulation.  [] (17691) Provided verbal/tactile cueing for activities related to improving balance, coordination, kinesthetic sense, posture, motor skill, proprioception  to assist with LE, proximal hip, and core control in self care, mobility, lifting, ambulation and eccentric single leg control.      NMR and Therapeutic Activities:    [x] (49628 or 64133) Provided verbal/tactile cueing for activities related to improving balance, coordination, kinesthetic sense, posture, motor skill, proprioception and motor activation to allow for proper function of core, proximal hip and LE with self care and ADLs  [] (55990) Gait Re-education- Provided training and instruction to the patient for proper LE, core and proximal hip recruitment and positioning and eccentric body weight control with ambulation re-education including up and down stairs     Home Exercise Program:    [x] (45521) Reviewed/Progressed HEP activities related to strengthening, flexibility, endurance, ROM of core, proximal hip and LE for functional self-care, mobility, lifting and ambulation/stair navigation   [] (83567)Reviewed/Progressed HEP activities related to improving balance, coordination, kinesthetic sense, posture, motor skill, proprioception of core, proximal hip and LE for self care, mobility, lifting, and ambulation/stair navigation    Access Code: [email or web address]  URL: [email or web address]  Date: 10/14/2021  Prepared by: Nellie Asif    Exercises  Seated Table Hamstring Stretch - 2 x daily - 7 x weekly - 5 reps - 1 sets - 30 hold  Standing Gastroc Stretch - 2 x daily - 7 x weekly - 1 sets - 5 reps - 30 hold  Standing ITB Stretch - 1 x daily - 7 x weekly - 1 sets - 3-5 reps - 30 hold  Sitting Heel Slide with Towel - 2 x daily - 7 x weekly - 10 reps - 1 sets - 10 hold  Beginner Bridge - 1 x daily - 7 x weekly - 3 sets - 5-8 reps  Standing Hip Abduction with Counter Support - 1 x daily - 7 x weekly - 3 sets - 10 reps  Standing March - 1 x daily - 7 x weekly - 3 sets - 10 reps  Sitting Knee Extension with Resistance - 1 x daily - 7 x weekly - 3 sets - 10 reps  Standing Hamstring Curl with Resistance - 1 x daily - 7 x weekly - 3 sets - 10 reps  Standing Heel Raise - 1 x daily - 7 x weekly - 3 sets - 10 reps  Single Leg Stance with Support - 1 x daily - 7 x weekly - 1 sets - 3 reps - 30 hold  Clamshell - 1 x daily - 7 x weekly - 3 sets - 10 reps  Sidelying Hip Abduction - 1 x daily - 7 x weekly - 3 sets - 10 reps      Manual Treatments:  PROM / STM / Oscillations-Mobs:  G-I, II, III, IV (PA's, Inf., Post.)  [x] (81864) Provided manual therapy to mobilize LE, proximal hip and/or LS spine soft tissue/joints for the purpose of modulating pain, promoting relaxation,  increasing ROM, reducing/eliminating soft tissue swelling/inflammation/restriction, improving soft tissue extensibility and allowing for proper ROM for normal function with self care, mobility, lifting and ambulation. Modalities: Will ice at home   [] GAME READY (VASO)- for significant edema, swelling, pain control. Charges:  Timed Code Treatment Minutes: 41   Total Treatment Minutes: 45     [] EVAL (LOW) 08814   [] EVAL (MOD) 62064   [] EVAL (HIGH) 46751   [] RE-EVAL   [x] NR(60375) x   1  [] IONTO  [x] NMR (37196) x  1   [] VASO  [] Manual (80907) x      [] Other:  [x] TA x1      [] Mech Traction (64261)  [] ES(attended) (71080)      [] ES (un) (73619):       GOALS: Patient stated goal: make L hip feel as good as R hip. Get back to walking and hiking      Therapist goals for Patient:  Short Term Goals: To be achieved in: 2 weeks  1. Independent in HEP and progression per patient tolerance, in order to prevent re-injury. []? Progressing: [x]? Met: []? Not Met: []? Adjusted   2. Patient will have a decrease in pain to facilitate improvement in movement, function, and ADLs as indicated by Functional Deficits. []? Progressing: [x]? Met: []? Not Met: []? Adjusted     Long Term Goals: To be achieved in: 8 weeks  1. Disability index score of 16% or less for the [facility] to assist with reaching prior level of function. []? Progressing: []? Met: []? Not Met: []? Adjusted  2. Patient will demonstrate increased L hip AROM to 100 flex, 40 abd to allow for proper joint functioning as indicated by patients Functional Deficits. []? Progressing: []? Met: []? Not Met: []? Adjusted  3. Patient will demonstrate an increase in L hip strength to 4/5 hip flex and abd and extn in LE to allow for proper functional mobility as indicated by patients Functional Deficits. []? Progressing: []? Met: []? Not Met: []? Adjusted  4. Patient will return to full community ambulation including up and down steps without increased symptoms or restriction. []? Progressing: []? Met: []? Not Met: []? Adjusted  5. Pt will return to going on 1 mile walk without pain or limitation in L hip.   []? Progressing: []? Met: []? Not Met: []? Adjusted           Overall Progression Towards Functional goals/ Treatment Progress Update:  [] Patient is progressing as expected towards functional goals listed. [] Progression is slowed due to complexities/Impairments listed. [] Progression has been slowed due to co-morbidities. [x] Plan just implemented, too soon to assess goals progression <30days   [] Goals require adjustment due to lack of progress  [] Patient is not progressing as expected and requires additional follow up with physician  [] Other    Prognosis for POC: [x] Good [] Fair  [] Poor      Patient requires continued skilled intervention: [x] Yes  [] No      ASSESSMENT:  See eval    Treatment/Activity Tolerance:  [x] Patient tolerated treatment well [] Patient limited by fatique  [] Patient limited by pain  [] Patient limited by other medical complications  [x] Other:Pt continues to do well in therapy. Did add in more hip stretching including hip flex to work on improving ability to reach feet for bathing and dressing. Also added in quad stretch which pt felt a good stretch with and educated pt on benefits of laying on stomach both for back and to stretch out anterior hips doing 5-10+ mins each day. He was progressed to SL on PRE machines and was fatigued with this. Attempted SLDL but pt was very challenged with balance on this so modified to small hip hinge and pt was still challenged with balance with this but was done at end of session.  Pt will continue to benefit from skilled PT to progress LE strength, stability/balance, hip ROM and LE flexibility to improve ability to perform dressing, ADLs, and housework without limitation due to hip. Return to Play: (if applicable)   []  Stage 1: Intro to Strength   []  Stage 2: Return to Run and Strength   []  Stage 3: Return to Jump and Strength   []  Stage 4: Dynamic Strength and Agility   []  Stage 5: Sport Specific Training     []  Ready to Return to Play, Meets All Above Stages   []  Not Ready for Return to Sports   Comments:            Prognosis: [x] Good [] Fair  [] Poor    Patient Requires Follow-up: [x] Yes  [] No    PLAN: See eval; continue with stick massage at end of session; consider bridge with hips extended and feet on bosu  [x] Continue per plan of care [] Alter current plan (see comments above)  [] Plan of care initiated [] Hold pending MD visit [] Discharge    Note: If patient does not return for scheduled/ recommended follow up visits, this note will serve as a discharge from care along with most recent update on progress.      Electronically signed by: Leela Escamilla PT PT, DPT

## 2021-11-02 ENCOUNTER — HOSPITAL ENCOUNTER (OUTPATIENT)
Dept: PHYSICAL THERAPY | Age: 56
Setting detail: THERAPIES SERIES
Discharge: HOME | End: 2021-11-02
Payer: COMMERCIAL

## 2021-11-02 PROCEDURE — 97530 THERAPEUTIC ACTIVITIES: CPT | Performed by: PHYSICAL THERAPIST

## 2021-11-02 PROCEDURE — 97112 NEUROMUSCULAR REEDUCATION: CPT | Performed by: PHYSICAL THERAPIST

## 2021-11-02 PROCEDURE — 97110 THERAPEUTIC EXERCISES: CPT | Performed by: PHYSICAL THERAPIST

## 2021-11-02 NOTE — FLOWSHEET NOTE
[address] [facility], [place]                                                         Physical Therapy Daily Treatment Note  Date:  2021    Patient Name:  Earl Willis    :  1965  MRN: [id number]    Medical/Treatment Diagnosis Information:  · Diagnosis: A89.583 (ICD-10-CM) - H/O total hip arthroplasty, left on 9/15/21  · Treatment Diagnosis: M25.552 (L) hip pain; M62.81 muscle weakness  Insurance/Certification information:  PT Insurance Information: [facility]- no auth  Physician Information:  Referring Practitioner: Dr. Mj Mike MD  Has the plan of care been signed (Y/N):        [x]  Yes  []  No     Date of Patient follow up with Physician: 10/28/21      Is this a Progress Report:     []  Yes  [x]  No        If Yes:  Date Range for reporting period:  Beginning- 10/5/2021  Ending    Progress report will be due (10 Rx or 30 days whichever is less): 10 visits or 46       Recertification will be due (POC Duration  / 90 days whichever is less): as above        Visit # Insurance Allowable Auth Required   , used one priorly this year []  Yes [x]  No        Functional Scale: WOMAC- 28%    Date assessed:  10/5/2021     Latex Allergy:  [x]NO      []YES  Preferred Language for Healthcare:   [x]English       []other:    Pain level:  0/10  on 2021    SUBJECTIVE:  Pt is 6.5 week s/p. Pt felt fine after last session. He states he was lazy this weekend and did not do his home exercises because he was out of town. OBJECTIVE: See eval   Observation:    Test measurements:    Joint mobility: n/t              []?Normal                       []?Hypo              []?Hyper     Palpation: effusion lateral thigh along IT band     Functional Mobility/Transfers: Indep     Posture:  WNL     Bandages/Dressings/Incisions: incision healing well, no signs/symptoms of infection 10/14/21      Gait: (include devices/WB status) decreased L hip extn ; arrived carrying cane in hand/not using     Single leg stance eyes open- R: 28 secs ;L: 8 secs sore lateral hip              SLS eyes closed- R: ; L:      Squats: mild weight shift to R LE                   ROM PROM AROM Comments     Left Right Left Right     Flexion     55 93     Extension             Abduction 20 32         Adduction             ER 35 30         IR 20 37                          Strength Left Right Comments   Hip flexors Not resisted due to stiffness with active motion 4+     Hip extension         Hip abduction         Hip adduction         Hip ER         Hip IR         Quads 4  5     Hamstrings 4 5        Flexibility Left Right Comments   Hamstrings 50 70 90/90 position   ITB (Obers test)         Hip flexor(Navjot test)         gastroc Fair-  fair     Rectus femoris(Elys test)     Heel to buttock distance                    RESTRICTIONS/PRECAUTIONS: none    Exercises/Interventions:     Exercise/Equipment Resistance Repetitions Other comments   bike      Stretching      Hamstring  3 x 30 secs seated    Hip Flexor      ITB- standing   [id number] gentle    Grion      Quad  3 x 30 secs prone    Inclined Calf  3 x 30 secs  runners in HEP   Towel Pull      Piriformis- figure 4      Knee to  shoulder  3 x 30 secs                SLR      Hip flexion      Hip extension Bent over table  Cues for glute set hold at top   Hip Abduction  4#cuff weight   Standing hip marches     LAQ     Standing HS curl          Hip Adducton     SLR+     clamshells  Green resistance band; VC for controlling motion   Isometrics      Quad sets      Ball Squeezes      Patellar Glides      Medial      Superior      Inferior            ROM      Working on hip ROM   Active      Weight Shift      Hang Weights      Sheet Pulls      Ankle Pumps            CKC      Calf raises      Wall sits  3 x 30 secs + BS    Step ups      1 leg stand     Step touch down   Cues for control descent with small motion and to avoid knee valgus   Squatting  Cues to perform with hip flexion prior to knee and avoid knees over toes   Single leg dead-lift  Ball Reaches to table with small hip hinge 2 x 10  Difficult for pt to maintain balance         Isometric hip abduction into wall  Foot on step stool 5 x 10 secs B    Hip hikes      CC TKE      rockerboard      Balance      Lateral band walks  5 half laps green band    Monster Walks      Bridging  Feet on BOSU and hips extended 3 x 5 hold 3 secs    Triple threats      Stool Scoots      PRE      Extension  3 x 10 40 lbs SL RANGE:   Flexion  3x10 SL 40 lbs  RANGE:         Cable Column            Leg Press  3 x 10 70 lbs SL-  RANGE:         Bike  X 5 mins    Treadmill            Manual treatment      Myofascial stick massage      IASTM          Patient education: Pt was educated on PT diagnosis, prognosis, and plan of care. Pt was educated on the use of ice throughout the day. Reviewed insurance benefits for physical therapy in an outpatient hospital based setting with the patient, including deductible of met and allowable visit number. Pt was informed of possible out of pocket costs      Therapeutic Exercise and NMR EXR  [x] (21453) Provided verbal/tactile cueing for activities related to strengthening, flexibility, endurance, ROM for improvements in LE, proximal hip, and core control with self care, mobility, lifting, ambulation.  [] (16256) Provided verbal/tactile cueing for activities related to improving balance, coordination, kinesthetic sense, posture, motor skill, proprioception  to assist with LE, proximal hip, and core control in self care, mobility, lifting, ambulation and eccentric single leg control.      NMR and Therapeutic Activities:    [x] (02976 or 98348) Provided verbal/tactile cueing for activities related to improving balance, coordination, kinesthetic sense, posture, motor skill, proprioception and motor activation to allow for proper function of core, proximal hip and LE with self care and ADLs  [] (51611) Gait Re-education- Provided training and instruction to the patient for proper LE, core and proximal hip recruitment and positioning and eccentric body weight control with ambulation re-education including up and down stairs     Home Exercise Program:    [x] (93024) Reviewed/Progressed HEP activities related to strengthening, flexibility, endurance, ROM of core, proximal hip and LE for functional self-care, mobility, lifting and ambulation/stair navigation   [] (26063)Reviewed/Progressed HEP activities related to improving balance, coordination, kinesthetic sense, posture, motor skill, proprioception of core, proximal hip and LE for self care, mobility, lifting, and ambulation/stair navigation    Access Code: [email or web address]  URL: [email or web address]  Date: 10/14/2021  Prepared by: Genie Brennan    Exercises  Seated Table Hamstring Stretch - 2 x daily - 7 x weekly - 5 reps - 1 sets - 30 hold  Standing Gastroc Stretch - 2 x daily - 7 x weekly - 1 sets - 5 reps - 30 hold  Standing ITB Stretch - 1 x daily - 7 x weekly - 1 sets - 3-5 reps - 30 hold  Sitting Heel Slide with Towel - 2 x daily - 7 x weekly - 10 reps - 1 sets - 10 hold  Beginner Bridge - 1 x daily - 7 x weekly - 3 sets - 5-8 reps  Standing Hip Abduction with Counter Support - 1 x daily - 7 x weekly - 3 sets - 10 reps  Standing March - 1 x daily - 7 x weekly - 3 sets - 10 reps  Sitting Knee Extension with Resistance - 1 x daily - 7 x weekly - 3 sets - 10 reps  Standing Hamstring Curl with Resistance - 1 x daily - 7 x weekly - 3 sets - 10 reps  Standing Heel Raise - 1 x daily - 7 x weekly - 3 sets - 10 reps  Single Leg Stance with Support - 1 x daily - 7 x weekly - 1 sets - 3 reps - 30 hold  Clamshell - 1 x daily - 7 x weekly - 3 sets - 10 reps  Sidelying Hip Abduction - 1 x daily - 7 x weekly - 3 sets - 10 reps      Manual Treatments:  PROM / STM / Oscillations-Mobs:  G-I, II, III, IV (PA's, Inf., Post.)  [x] (18419) Provided manual therapy to mobilize LE, proximal hip and/or LS spine soft tissue/joints for the purpose of modulating pain, promoting relaxation,  increasing ROM, reducing/eliminating soft tissue swelling/inflammation/restriction, improving soft tissue extensibility and allowing for proper ROM for normal function with self care, mobility, lifting and ambulation. Modalities: Will ice at home   [] GAME READY (VASO)- for significant edema, swelling, pain control. Charges:  Timed Code Treatment Minutes: 45   Total Treatment Minutes: 48     [] EVAL (LOW) 78747   [] EVAL (MOD) 02630   [] EVAL (HIGH) 12879   [] RE-EVAL   [x] FU(02530) x   1  [] IONTO  [x] NMR (52115) x  1   [] VASO  [] Manual (58078) x      [] Other:  [x] TA x1      [] Mech Traction (58639)  [] ES(attended) (99900)      [] ES (un) (22266):       GOALS: Patient stated goal: make L hip feel as good as R hip. Get back to walking and hiking      Therapist goals for Patient:  Short Term Goals: To be achieved in: 2 weeks  1. Independent in HEP and progression per patient tolerance, in order to prevent re-injury. []? Progressing: [x]? Met: []? Not Met: []? Adjusted   2. Patient will have a decrease in pain to facilitate improvement in movement, function, and ADLs as indicated by Functional Deficits. []? Progressing: [x]? Met: []? Not Met: []? Adjusted     Long Term Goals: To be achieved in: 8 weeks  1. Disability index score of 16% or less for the [facility] to assist with reaching prior level of function. []? Progressing: []? Met: []? Not Met: []? Adjusted  2. Patient will demonstrate increased L hip AROM to 100 flex, 40 abd to allow for proper joint functioning as indicated by patients Functional Deficits. []? Progressing: []? Met: []? Not Met: []? Adjusted  3. Patient will demonstrate an increase in L hip strength to 4/5 hip flex and abd and extn in LE to allow for proper functional mobility as indicated by patients Functional Deficits. []? Progressing: []? Met: []? Not Met: []? Adjusted  4. Patient will return to full community ambulation including up and down steps without increased symptoms or restriction. []? Progressing: []? Met: []? Not Met: []? Adjusted  5. Pt will return to going on 1 mile walk without pain or limitation in L hip.   []? Progressing: []? Met: []? Not Met: []? Adjusted           Overall Progression Towards Functional goals/ Treatment Progress Update:  [] Patient is progressing as expected towards functional goals listed. [] Progression is slowed due to complexities/Impairments listed. [] Progression has been slowed due to co-morbidities. [x] Plan just implemented, too soon to assess goals progression <30days   [] Goals require adjustment due to lack of progress  [] Patient is not progressing as expected and requires additional follow up with physician  [] Other    Prognosis for POC: [x] Good [] Fair  [] Poor      Patient requires continued skilled intervention: [x] Yes  [] No      ASSESSMENT:  See eval    Treatment/Activity Tolerance:  [x] Patient tolerated treatment well [] Patient limited by fatique  [] Patient limited by pain  [] Patient limited by other medical complications  [x] Other: Pt was challenged by small changes to exercises done in session today. He felt like he had better glue activation on R side with bridges so tried to focus on improving L glute activation. He was again challenged by returning to upright posture with forward reaches with SL balance and was cued for glute activation. He will continue to work on his SL balance stability and return to doing HEP this week. No increase in pain throughout session. Pt will continue to benefit from skilled PT to progress LE strength, stability/balance, hip ROM and LE flexibility to improve ability to perform dressing, ADLs, and housework without limitation due to hip.        Return to Play: (if applicable)   []  Stage 1: Intro to Strength   []  Stage 2: Return to Run and Strength   []  Stage 3: Return to Jump and Strength   []  Stage 4: Dynamic Strength and Agility   []  Stage 5: Sport Specific Training     []  Ready to Return to Play, Meets All Above Stages   []  Not Ready for Return to Sports   Comments:            Prognosis: [x] Good [] Fair  [] Poor    Patient Requires Follow-up: [x] Yes  [] No    PLAN: See eval; continue with stick massage at end of session as needed; consider step up to balance and posterior lunge  [x] Continue per plan of care [] Alter current plan (see comments above)  [] Plan of care initiated [] Hold pending MD visit [] Discharge    Note: If patient does not return for scheduled/ recommended follow up visits, this note will serve as a discharge from care along with most recent update on progress.      Electronically signed by: Aiyana Hutson, PT PT, DPT

## 2021-11-05 ENCOUNTER — HOSPITAL ENCOUNTER (OUTPATIENT)
Dept: PHYSICAL THERAPY | Age: 56
Setting detail: THERAPIES SERIES
Discharge: HOME | End: 2021-11-05
Payer: COMMERCIAL

## 2021-11-05 PROCEDURE — 97530 THERAPEUTIC ACTIVITIES: CPT | Performed by: PHYSICAL THERAPIST

## 2021-11-05 PROCEDURE — 97112 NEUROMUSCULAR REEDUCATION: CPT | Performed by: PHYSICAL THERAPIST

## 2021-11-05 PROCEDURE — 97110 THERAPEUTIC EXERCISES: CPT | Performed by: PHYSICAL THERAPIST

## 2021-11-05 NOTE — PLAN OF CARE
[address] [facility],[address], [place]                                                     Physical Therapy Re-Certification Plan of Joseline Rodriguez      Dear  Dr [facility],    We had the pleasure of treating the following patient for physical therapy services at [address] [place] [address]. A summary of our findings can be found in the updated assessment below. This includes our plan of care. If you have any questions or concerns regarding these findings, please do not hesitate to contact me at the office phone number checked above. Thank you for the referral.     Physician Signature:________________________________Date:__________________  By signing above (or electronic signature), therapists plan is approved by physician    Total Visits to Date: 10  Overall Response to Treatment:   [x]Patient is responding well to treatment and improvement is noted with regards  to goals   []Patient should continue to improve in reasonable time if they continue HEP   []Patient has plateaued and is no longer responding to skilled PT intervention    []Patient is getting worse and would benefit from return to referring MD   []Patient unable to adhere to initial POC   [x]Other: Pt is recovering well overall from JOON. He does not have any pain. He has good form on steps up and over today and is Indep with ambulation but does have decreased hip extn B and mild decreased hip flex on L that he thinks is from habit of compensation before surgery. He was encouraged to work on improving this by bringing his leg straight through (not circumducting) and flexing at hip. Pt is tight into hip extn B so educated pt on laying on stomach and even propping up to elbows each day to stretch hip flexors and doing quad stretch B. He also is tight into hip flex B so will continue with this stretch to improve ability to reach feet for dressing and bathing.  He has greatly improved in LE strength since surgery but still weak in hip abd and hip extn. His balance is also improving but less stable on L LE radha when movement or forward hinge is added. Pt was challenged by posterior lunges on sliders today. Pt will continue to benefit from skilled PT to progress LE strength, stability/balance, hip ROM and LE flexibility to improve ability to perform dressing, ADLs, and housework without limitation due to hip. Physical Therapy Daily Treatment Note  Date:  2021    Patient Name:  Paddy Mcallister    :  1965  MRN: [id number]    Medical/Treatment Diagnosis Information:  · Diagnosis: Z07.979 (ICD-10-CM) - H/O total hip arthroplasty, left on 9/15/21  · Treatment Diagnosis: M25.552 (L) hip pain; M62.81 muscle weakness  Insurance/Certification information:  PT Insurance Information: [facility]- no auth  Physician Information:  Referring Practitioner: Dr. Tanya Harrington MD  Has the plan of care been signed (Y/N):        [x]  Yes  []  No     Date of Patient follow up with Physician: 21      Is this a Progress Report:     [x]  Yes  []  No        If Yes:  Date Range for reporting period:  Beginning- 10/5/2021  Ending -21    Progress report will be due (10 Rx or 30 days whichever is less):       Recertification will be due (POC Duration  / 90 days whichever is less): as above        Visit # Insurance Allowable Auth Required   10 19, used one priorly this year []  Yes [x]  No        Functional Scale: WOMAC- 8.3%    Date assessed:  2021     Latex Allergy:  [x]NO      []YES  Preferred Language for Healthcare:   [x]English       []other:    Pain level:  0/10  on 2021    SUBJECTIVE:  Pt is 7 week s/p. Pt felt fine after last session. He was shaky and tired but no pain. He feels like he is doing steps well with step over pattern up and down. He does still feel limited in putting shoes and socks on, bending over, squatting, and reaching floor.  He feels good walking and like he is not limited in walking. OBJECTIVE: 11/5/21   Observation:    Test measurements:    Joint mobility: n/t              []?Normal                       []?Hypo              []?Hyper     Palpation:     Functional Mobility/Transfers: Indep     Posture:  WNL     Bandages/Dressings/Incisions: pt reports no concerns about incision     Gait: (include devices/WB status) Indep and mild decreased hip extn B, mild decreased L hip flex     Single leg stance eyes open- R: 30 secs ;L: 30 secs more shaky than R              SLS eyes closed- R: ; L:      Squats: even WBing, no pain                   ROM PROM AROM Comments     Left Right Left Right     Flexion     95 93     Extension      0 6      Abduction 36 32         Adduction             ER 31 30         IR 21 37                          Strength Left Right Comments   Hip flexors 4+ 4+     Hip extension  4-  4     Hip abduction  4-  4+     Hip adduction         Hip ER         Hip IR         Quads 5 5     Hamstrings 5 5        Flexibility Left Right Comments   Hamstrings 62 70 90/90 position   ITB (Obers test)         Hip flexor(Navjot test)  poor poor      gastroc Fair fair     Rectus femoris(Elys test)  8 in  2 in Heel to buttock distance                    RESTRICTIONS/PRECAUTIONS: none    Exercises/Interventions:     Exercise/Equipment Resistance Repetitions Other comments   bike      Stretching      Hamstring     Hip Flexor     ITB- standing      Grion      Quad  3 x 30 secs prone    Inclined Calf    runners in HEP   Towel Pull      Piriformis- figure 4      Knee to  shoulder  3 x 30 secs                SLR      Hip flexion      Hip extension Bent over table  Cues for glute set hold at top   Hip Abduction  4#cuff weight   Standing hip marches     LAQ     Standing HS curl          Hip Adducton     SLR+     clamshells  Green resistance band; VC for controlling motion   Isometrics      Quad sets      Ball Squeezes      Patellar Glides      Medial      Superior Inferior            ROM      Working on hip ROM   Active      Weight Shift      Hang Weights      Sheet Pulls      Ankle Pumps            CKC      Calf raises      Wall sits  3 x 30, 30, 45 secs + BS    Step ups  To SLS hold x5 secs, 2 x 10     1 leg stand     Step touch down   3 x 10; 4\" steCues for control descent with small motion and to avoid knee valgus   Squatting  Cues to perform with hip flexion prior to knee and avoid knees over toes   Single leg dead-lift   Difficult for pt to maintain balance   Posterior lunge  2 x 10     Isometric hip abduction into wall   5 x 10 secs B    Hip hikes      CC TKE      rockerboard      Balance      Lateral band walks  5 half laps green band    [employer]      Bridging      Triple threats      Stool Scoots      PRE      Extension  3 x 10 40 lbs SL RANGE:   Flexion  3x10 SL 40 lbs  RANGE:         Cable Column            Leg Press  3 x 10 80 lbs SL-  RANGE:         Bike  X 5 mins    Treadmill            Manual treatment      Myofascial stick massage      IASTM          Patient education: Pt was educated on PT diagnosis, prognosis, and plan of care. Pt was educated on the use of ice throughout the day. Reviewed insurance benefits for physical therapy in an outpatient hospital based setting with the patient, including deductible of met and allowable visit number. Pt was informed of possible out of pocket costs      Therapeutic Exercise and NMR EXR  [x] (19804) Provided verbal/tactile cueing for activities related to strengthening, flexibility, endurance, ROM for improvements in LE, proximal hip, and core control with self care, mobility, lifting, ambulation.  [] (52319) Provided verbal/tactile cueing for activities related to improving balance, coordination, kinesthetic sense, posture, motor skill, proprioception  to assist with LE, proximal hip, and core control in self care, mobility, lifting, ambulation and eccentric single leg control.      NMR and Therapeutic Activities:    [x] (76315 or 21854) Provided verbal/tactile cueing for activities related to improving balance, coordination, kinesthetic sense, posture, motor skill, proprioception and motor activation to allow for proper function of core, proximal hip and LE with self care and ADLs  [] (13859) Gait Re-education- Provided training and instruction to the patient for proper LE, core and proximal hip recruitment and positioning and eccentric body weight control with ambulation re-education including up and down stairs     Home Exercise Program:    [x] (83602) Reviewed/Progressed HEP activities related to strengthening, flexibility, endurance, ROM of core, proximal hip and LE for functional self-care, mobility, lifting and ambulation/stair navigation   [] (16698)Reviewed/Progressed HEP activities related to improving balance, coordination, kinesthetic sense, posture, motor skill, proprioception of core, proximal hip and LE for self care, mobility, lifting, and ambulation/stair navigation    Access Code: [email or web address]  URL: [email or web address]  Date: 10/14/2021  Prepared by: Shayla Carrillo    Exercises  Seated Table Hamstring Stretch - 2 x daily - 7 x weekly - 5 reps - 1 sets - 30 hold  Standing Gastroc Stretch - 2 x daily - 7 x weekly - 1 sets - 5 reps - 30 hold  Standing ITB Stretch - 1 x daily - 7 x weekly - 1 sets - 3-5 reps - 30 hold  Sitting Heel Slide with Towel - 2 x daily - 7 x weekly - 10 reps - 1 sets - 10 hold  Beginner Bridge - 1 x daily - 7 x weekly - 3 sets - 5-8 reps  Standing Hip Abduction with Counter Support - 1 x daily - 7 x weekly - 3 sets - 10 reps  Standing March - 1 x daily - 7 x weekly - 3 sets - 10 reps  Sitting Knee Extension with Resistance - 1 x daily - 7 x weekly - 3 sets - 10 reps  Standing Hamstring Curl with Resistance - 1 x daily - 7 x weekly - 3 sets - 10 reps  Standing Heel Raise - 1 x daily - 7 x weekly - 3 sets - 10 reps  Single Leg Stance with Support - 1 x daily - 7 x weekly - 1 sets - 3 reps - 30 hold  Clamshell - 1 x daily - 7 x weekly - 3 sets - 10 reps  Sidelying Hip Abduction - 1 x daily - 7 x weekly - 3 sets - 10 reps      Manual Treatments:  PROM / STM / Oscillations-Mobs:  G-I, II, III, IV (PA's, Inf., Post.)  [x] (46697) Provided manual therapy to mobilize LE, proximal hip and/or LS spine soft tissue/joints for the purpose of modulating pain, promoting relaxation,  increasing ROM, reducing/eliminating soft tissue swelling/inflammation/restriction, improving soft tissue extensibility and allowing for proper ROM for normal function with self care, mobility, lifting and ambulation. Modalities: Will ice at home   [] GAME READY (VASO)- for significant edema, swelling, pain control. Charges:  Timed Code Treatment Minutes: 46   Total Treatment Minutes: 51     [] EVAL (LOW) 65768   [] EVAL (MOD) 26018   [] EVAL (HIGH) 21337   [] RE-EVAL   [x] EJ(80977) x   1  [] IONTO  [x] NMR (09564) x  1   [] VASO  [] Manual (88319) x      [] Other:  [x] TA x1      [] Mech Traction (29546)  [] ES(attended) (32994)      [] ES (un) (85641):       GOALS: Patient stated goal: make L hip feel as good as R hip. Get back to walking and hiking      Therapist goals for Patient:  Short Term Goals: To be achieved in: 2 weeks  1. Independent in HEP and progression per patient tolerance, in order to prevent re-injury. []? Progressing: [x]? Met: []? Not Met: []? Adjusted   2. Patient will have a decrease in pain to facilitate improvement in movement, function, and ADLs as indicated by Functional Deficits. []? Progressing: [x]? Met: []? Not Met: []? Adjusted     Long Term Goals: To be achieved in: 8 weeks  1. Disability index score of 16% or less for the [facility] to assist with reaching prior level of function. []? Progressing: [x]? Met: []? Not Met: []? Adjusted  2.  Patient will demonstrate increased L hip AROM to 100 flex, 40 abd to allow for proper joint functioning as indicated by patients Functional Deficits. [x]? Progressing: []? Met: [x]? Not Met: []? Adjusted  3. Patient will demonstrate an increase in L hip strength to 4/5 hip flex and abd and extn in LE to allow for proper functional mobility as indicated by patients Functional Deficits. [x]? Progressing: []? Met: [x]? Not Met: []? Adjusted  4. Patient will return to full community ambulation including up and down steps without increased symptoms or restriction. []? Progressing: [x]? Met: []? Not Met: []? Adjusted  5. Pt will return to going on 1 mile walk without pain or limitation in L hip. [x]? Progressing: hasn't tried but thinks he could  []? Met: []? Not Met: []? Adjusted           Overall Progression Towards Functional goals/ Treatment Progress Update:  [x] Patient is progressing as expected towards functional goals listed. [] Progression is slowed due to complexities/Impairments listed. [] Progression has been slowed due to co-morbidities. [] Plan just implemented, too soon to assess goals progression <30days   [] Goals require adjustment due to lack of progress  [] Patient is not progressing as expected and requires additional follow up with physician  [] Other    Prognosis for POC: [x] Good [] Fair  [] Poor      Patient requires continued skilled intervention: [x] Yes  [] No      ASSESSMENT:      Treatment/Activity Tolerance:  [x] Patient tolerated treatment well [] Patient limited by fatique  [] Patient limited by pain  [] Patient limited by other medical complications  [x] Other:  Pt is recovering well overall from JOON. He does not have any pain. He has good form on steps up and over today and is Indep with ambulation but does have decreased hip extn B and mild decreased hip flex on L that he thinks is from habit of compensation before surgery. He was encouraged to work on improving this by bringing his leg straight through (not circumducting) and flexing at hip.  Pt is tight into hip extn B so educated pt on laying on

## 2021-11-16 ENCOUNTER — HOSPITAL ENCOUNTER (OUTPATIENT)
Dept: PHYSICAL THERAPY | Age: 56
Setting detail: THERAPIES SERIES
Discharge: HOME | End: 2021-11-16
Payer: COMMERCIAL

## 2021-11-16 PROCEDURE — 97112 NEUROMUSCULAR REEDUCATION: CPT | Performed by: PHYSICAL THERAPIST

## 2021-11-16 PROCEDURE — 97110 THERAPEUTIC EXERCISES: CPT | Performed by: PHYSICAL THERAPIST

## 2021-11-16 PROCEDURE — 97530 THERAPEUTIC ACTIVITIES: CPT | Performed by: PHYSICAL THERAPIST

## 2021-11-16 NOTE — FLOWSHEET NOTE
[facility] [place] [facility], [place]      Physical Therapy Daily Treatment Note  Date:  2021    Patient Name:  Yvan Cash    :  1965  MRN: [id number]    Medical/Treatment Diagnosis Information:  · Diagnosis: J18.882 (ICD-10-CM) - H/O total hip arthroplasty, left on 9/15/21  · Treatment Diagnosis: M25.552 (L) hip pain; M62.81 muscle weakness  Insurance/Certification information:  PT Insurance Information: [facility]- no auth  Physician Information:  Referring Practitioner: Dr. Sue Omalley MD  Has the plan of care been signed (Y/N):        [x]  Yes  []  No     Date of Patient follow up with Physician: 21      Is this a Progress Report:     []  Yes  [x]  No        If Yes:  Date Range for reporting period:  Beginning- 10/5/2021  Ending -21    Progress report will be due (10 Rx or 30 days whichever is less):       Recertification will be due (POC Duration  / 90 days whichever is less): as above        Visit # Insurance Allowable Auth Required   , used one priorly this year []  Yes [x]  No        Functional Scale: WOMAC- 8.3%    Date assessed:  2021     Latex Allergy:  [x]NO      []YES  Preferred Language for Healthcare:   [x]English       []other:    Pain level:  0/10  on 2021    SUBJECTIVE:  Pt is 8.5 week s/p. Pt was on vacation last week. He did a lot of walking and felt really good with this. He only did exercises two times while on vacation. OBJECTIVE: 21   Observation:    Test measurements:    Joint mobility: n/t              []?Normal                       []?Hypo              []?Hyper     Palpation:     Functional Mobility/Transfers: Indep     Posture:  WNL     Bandages/Dressings/Incisions: pt reports no concerns about incision     Gait: (include devices/WB status) Indep and mild decreased hip extn B, mild decreased L hip flex     Single leg stance eyes open- R: 30 secs ;L: 30 secs more shaky than R              SLS eyes closed- R: ; L:      Squats: even WBing, no pain                   ROM PROM AROM Comments     Left Right Left Right     Flexion     95 93     Extension      0 6      Abduction 36 32         Adduction             ER 31 30         IR 21 37                          Strength Left Right Comments   Hip flexors 4+ 4+     Hip extension  4-  4     Hip abduction  4-  4+     Hip adduction         Hip ER         Hip IR         Quads 5 5     Hamstrings 5 5        Flexibility Left Right Comments   Hamstrings 62 70 90/90 position   ITB (Obers test)         Hip flexor(Navjot test)  poor poor      gastroc Fair fair     Rectus femoris(Elys test)  8 in  2 in Heel to buttock distance                    RESTRICTIONS/PRECAUTIONS: none    Exercises/Interventions:     Exercise/Equipment Resistance Repetitions Other comments   bike      Stretching      Hamstring     Hip Flexor  3 x 30 secs standing gentle   ITB- standing      Grion      Quad  3 x 30 secs prone    Inclined Calf    runners in HEP   Towel Pull      Piriformis- figure 4  Supine 3 x 30 secs, crossing foot over knee, gentle stretch    Knee to  shoulder  3 x 30 secs                SLR      Hip flexion      Hip extension Bent over table  Cues for glute set hold at top   Hip Abduction  4#cuff weight   Standing hip marches     LAQ     Standing HS curl          Hip Adducton     SLR+     clamshells  Green resistance band; VC for controlling motion   Isometrics      Quad sets      Ball Squeezes      Patellar Glides      Medial      Superior      Inferior            ROM      Working on hip ROM   Active      Weight Shift      Hang Weights      Sheet Pulls      Ankle Pumps            CKC      Calf raises      Wall sits     Step ups     1 leg stand     Step touch down   Cues for control descent with small motion and to avoid knee valgus   Squatting  Cues to perform with hip flexion prior to knee and avoid knees over toes   Single leg dead-lift 2 x 10   Golfers lift holding onto table for balance Difficult for pt to maintain balance   Posterior lunge on slider  2 x 10     Isometric hip abduction into wall   5 x 10 secs B    Hip hikes      CC TKE      deadlift squat  Reaching kettlebell off step and setting back down 3 x 5 reps off black step    rockerboard      Balance      Lateral band walks      [employer]      Bridging      Triple threats      Stool Scoots      PRE      Extension  3 x 10 40 lbs SL RANGE:   Flexion  3x10 SL 40 lbs  RANGE:         Cable Column            Leg Press  3 x 10 80 lbs SL-  RANGE:         Bike  X 5 mins    Treadmill            Manual treatment      Myofascial stick massage      IASTM          Patient education: Pt was educated on PT diagnosis, prognosis, and plan of care. Pt was educated on the use of ice throughout the day. Reviewed insurance benefits for physical therapy in an outpatient hospital based setting with the patient, including deductible of met and allowable visit number. Pt was informed of possible out of pocket costs      Therapeutic Exercise and NMR EXR  [x] (53683) Provided verbal/tactile cueing for activities related to strengthening, flexibility, endurance, ROM for improvements in LE, proximal hip, and core control with self care, mobility, lifting, ambulation.  [] (04182) Provided verbal/tactile cueing for activities related to improving balance, coordination, kinesthetic sense, posture, motor skill, proprioception  to assist with LE, proximal hip, and core control in self care, mobility, lifting, ambulation and eccentric single leg control.      NMR and Therapeutic Activities:    [x] (55328 or 59301) Provided verbal/tactile cueing for activities related to improving balance, coordination, kinesthetic sense, posture, motor skill, proprioception and motor activation to allow for proper function of core, proximal hip and LE with self care and ADLs  [] (24931) Gait Re-education- Provided training and instruction to the patient for proper LE, core and proximal hip recruitment and positioning and eccentric body weight control with ambulation re-education including up and down stairs     Home Exercise Program:    [x] (79886) Reviewed/Progressed HEP activities related to strengthening, flexibility, endurance, ROM of core, proximal hip and LE for functional self-care, mobility, lifting and ambulation/stair navigation   [] (56181)Reviewed/Progressed HEP activities related to improving balance, coordination, kinesthetic sense, posture, motor skill, proprioception of core, proximal hip and LE for self care, mobility, lifting, and ambulation/stair navigation    Access Code: [email or web address]  URL: [email or web address]  Date: 10/14/2021  Prepared by: Ok Vernon    Exercises  Seated Table Hamstring Stretch - 2 x daily - 7 x weekly - 5 reps - 1 sets - 30 hold  Standing Gastroc Stretch - 2 x daily - 7 x weekly - 1 sets - 5 reps - 30 hold  Standing ITB Stretch - 1 x daily - 7 x weekly - 1 sets - 3-5 reps - 30 hold  Sitting Heel Slide with Towel - 2 x daily - 7 x weekly - 10 reps - 1 sets - 10 hold  Beginner Bridge - 1 x daily - 7 x weekly - 3 sets - 5-8 reps  Standing Hip Abduction with Counter Support - 1 x daily - 7 x weekly - 3 sets - 10 reps  Standing March - 1 x daily - 7 x weekly - 3 sets - 10 reps  Sitting Knee Extension with Resistance - 1 x daily - 7 x weekly - 3 sets - 10 reps  Standing Hamstring Curl with Resistance - 1 x daily - 7 x weekly - 3 sets - 10 reps  Standing Heel Raise - 1 x daily - 7 x weekly - 3 sets - 10 reps  Single Leg Stance with Support - 1 x daily - 7 x weekly - 1 sets - 3 reps - 30 hold  Clamshell - 1 x daily - 7 x weekly - 3 sets - 10 reps  Sidelying Hip Abduction - 1 x daily - 7 x weekly - 3 sets - 10 reps      Manual Treatments:  PROM / STM / Oscillations-Mobs:  G-I, II, III, IV (PA's, Inf., Post.)  [x] (41295) Provided manual therapy to mobilize LE, proximal hip and/or LS spine soft tissue/joints for the purpose of modulating pain, promoting relaxation,  increasing ROM, reducing/eliminating soft tissue swelling/inflammation/restriction, improving soft tissue extensibility and allowing for proper ROM for normal function with self care, mobility, lifting and ambulation. Modalities: Will ice at home   [] GAME READY (VASO)- for significant edema, swelling, pain control. Charges:  Timed Code Treatment Minutes: 41   Total Treatment Minutes: 45     [] EVAL (LOW) 17123   [] EVAL (MOD) 34144   [] EVAL (HIGH) 45493   [] RE-EVAL   [x] YR(11393) x   1  [] IONTO  [x] NMR (10232) x  1   [] VASO  [] Manual (44501) x      [] Other:  [x] TA x1      [] Mech Traction (53679)  [] ES(attended) (10802)      [] ES (un) (48218):       GOALS: Patient stated goal: make L hip feel as good as R hip. Get back to walking and hiking      Therapist goals for Patient:  Short Term Goals: To be achieved in: 2 weeks  1. Independent in HEP and progression per patient tolerance, in order to prevent re-injury. []? Progressing: [x]? Met: []? Not Met: []? Adjusted   2. Patient will have a decrease in pain to facilitate improvement in movement, function, and ADLs as indicated by Functional Deficits. []? Progressing: [x]? Met: []? Not Met: []? Adjusted     Long Term Goals: To be achieved in: 8 weeks  1. Disability index score of 16% or less for the [facility] to assist with reaching prior level of function. []? Progressing: [x]? Met: []? Not Met: []? Adjusted  2. Patient will demonstrate increased L hip AROM to 100 flex, 40 abd to allow for proper joint functioning as indicated by patients Functional Deficits. [x]? Progressing: []? Met: [x]? Not Met: []? Adjusted  3. Patient will demonstrate an increase in L hip strength to 4/5 hip flex and abd and extn in LE to allow for proper functional mobility as indicated by patients Functional Deficits. [x]? Progressing: []? Met: [x]? Not Met: []? Adjusted  4.  Patient will return to full community ambulation including up and down steps without increased symptoms or restriction. []? Progressing: [x]? Met: []? Not Met: []? Adjusted  5. Pt will return to going on 1 mile walk without pain or limitation in L hip. [x]? Progressing: hasn't tried but thinks he could  []? Met: []? Not Met: []? Adjusted           Overall Progression Towards Functional goals/ Treatment Progress Update:  [x] Patient is progressing as expected towards functional goals listed. [] Progression is slowed due to complexities/Impairments listed. [] Progression has been slowed due to co-morbidities. [] Plan just implemented, too soon to assess goals progression <30days   [] Goals require adjustment due to lack of progress  [] Patient is not progressing as expected and requires additional follow up with physician  [] Other    Prognosis for POC: [x] Good [] Fair  [] Poor      Patient requires continued skilled intervention: [x] Yes  [] No      ASSESSMENT:      Treatment/Activity Tolerance:  [x] Patient tolerated treatment well [] Patient limited by fatique  [] Patient limited by pain  [] Patient limited by other medical complications  [x] Other:  Did add in more stretches today to improve pts ability to cross leg to reach feet and to improve hip flexor flexibility. Also added a couple exercises with deadlift and SLDL/golfers lift to work on ability to reach and pick things off floor. Pt felt really challenged by the SLDL and posterior lunge on slider today with muscle fatigue. Pt will continue to benefit from skilled PT to progress LE strength, stability/balance, hip ROM and LE flexibility to improve ability to perform dressing, ADLs, and housework without limitation due to hip.        Return to Play: (if applicable)   []  Stage 1: Intro to Strength   []  Stage 2: Return to Run and Strength   []  Stage 3: Return to Jump and Strength   []  Stage 4: Dynamic Strength and Agility   []  Stage 5: Sport Specific Training     []  Ready to Return to Play, Meets All Above Stages   []  Not Ready for Return to Sports   Comments:            Prognosis: [x] Good [] Fair  [] Poor    Patient Requires Follow-up: [x] Yes  [] No    PLAN: 1-2x/week for 5 more weeks (11/5/21); [x] Continue per plan of care [] Alter current plan (see comments above)  [] Plan of care initiated [] Hold pending MD visit [] Discharge    Note: If patient does not return for scheduled/ recommended follow up visits, this note will serve as a discharge from care along with most recent update on progress.      Electronically signed by: Deanne Bosworth, PT, DPT

## 2021-11-19 ENCOUNTER — APPOINTMENT (OUTPATIENT)
Dept: PHYSICAL THERAPY | Age: 56
End: 2021-11-19
Payer: COMMERCIAL

## 2021-11-23 ENCOUNTER — HOSPITAL ENCOUNTER (OUTPATIENT)
Dept: PHYSICAL THERAPY | Age: 56
Setting detail: THERAPIES SERIES
Discharge: HOME | End: 2021-11-23
Payer: COMMERCIAL

## 2021-11-23 PROCEDURE — 97530 THERAPEUTIC ACTIVITIES: CPT | Performed by: PHYSICAL THERAPIST

## 2021-11-23 PROCEDURE — 97112 NEUROMUSCULAR REEDUCATION: CPT | Performed by: PHYSICAL THERAPIST

## 2021-11-23 PROCEDURE — 97110 THERAPEUTIC EXERCISES: CPT | Performed by: PHYSICAL THERAPIST

## 2021-11-23 NOTE — FLOWSHEET NOTE
[facility], [place]      Physical Therapy Daily Treatment Note  Date:  2021    Patient Name:  Hugo Jackson    :  1965  MRN: [id number]    Medical/Treatment Diagnosis Information:  · Diagnosis: F32.675 (ICD-10-CM) - H/O total hip arthroplasty, left on 9/15/21  · Treatment Diagnosis: M25.552 (L) hip pain; M62.81 muscle weakness  Insurance/Certification information:  PT Insurance Information: [facility]- no auth  Physician Information:  Referring Practitioner: Dr. Yoana Barnard MD  Has the plan of care been signed (Y/N):        [x]  Yes  []  No     Date of Patient follow up with Physician: 21      Is this a Progress Report:     []  Yes  [x]  No        If Yes:  Date Range for reporting period:  Beginning- 10/5/2021  Ending -21    Progress report will be due (10 Rx or 30 days whichever is less): 12      Recertification will be due (POC Duration  / 90 days whichever is less): as above        Visit # Insurance Allowable Auth Required   , used one priorly this year []  Yes [x]  No        Functional Scale: WOMAC- 8.3%    Date assessed:  2021     Latex Allergy:  [x]NO      []YES  Preferred Language for Healthcare:   [x]English       []other:    Pain level:  0/10  on 2021    SUBJECTIVE:  Pt is 9.5 week s/p. Pt states his L glute was quivering/tremoring after last visit. He was sore in his glute for 2-3 days after so cancelled his appt last Friday. He states it is better now and been feeling good all weekend. OBJECTIVE: 21   Observation:    Test measurements:    Joint mobility: n/t              []?Normal                       []?Hypo              []?Hyper     Palpation:     Functional Mobility/Transfers: Indep     Posture:  WNL     Bandages/Dressings/Incisions: pt reports no concerns about incision     Gait: (include devices/WB status) Indep and mild decreased hip extn B, mild decreased L hip flex     Single leg stance eyes open- R: 30 secs ;L: 30 secs more shaky than R              SLS eyes closed- R: ; L:      Squats: even WBing, no pain                   ROM PROM AROM Comments     Left Right Left Right     Flexion     95 93     Extension      0 6      Abduction 36 32         Adduction             ER 31 30         IR 21 37                          Strength Left Right Comments   Hip flexors 4+ 4+     Hip extension  4-  4     Hip abduction  4-  4+     Hip adduction         Hip ER         Hip IR         Quads 5 5     Hamstrings 5 5        Flexibility Left Right Comments   Hamstrings 62 70 90/90 position   ITB (Obers test)         Hip flexor(Navjot test)  poor poor      gastroc Fair fair     Rectus femoris(Elys test)  8 in  2 in Heel to buttock distance                    RESTRICTIONS/PRECAUTIONS: none    Exercises/Interventions:     Exercise/Equipment Resistance Repetitions Other comments   bike      Stretching      Hamstring     Hip Flexor  3 x 30 secs standing gentle   ITB- standing      Grion      Quad  3 x 30 secs prone    Inclined Calf    runners in HEP   Towel Pull      Piriformis- figure 4  Supine 3 x 30 secs, crossing foot over knee, gentle stretch    Knee to  shoulder  3 x 30 secs                SLR      Hip flexion      Hip extension Bent over table  Cues for glute set hold at top   Hip Abduction  4#cuff weight   Standing hip marches     LAQ     Standing HS curl          Hip Adducton     SLR+     clamshells  Green resistance band; VC for controlling motion   Isometrics      Quad sets      Ball Squeezes      Patellar Glides      Medial      Superior      Inferior            ROM      Working on hip ROM   Active      Weight Shift      Hang Weights      Sheet Pulls      Ankle Pumps            CKC      Calf raises      Wall sits  3 x 30, 30, 45 secs + BS   Step ups     1 leg stand     Step touch down   Cues for control descent with small motion and to avoid knee valgus   Squatting Cues to perform with hip flexion prior to knee and avoid knees over toes   Single leg dead-lift   1 x 10, 1 x 5   Golfers lift holding onto table for balance Difficult for pt to maintain balance   Posterior lunge on slider  3 x 10     Isometric hip abduction into wall   5 x 10 secs B    Hip hikes      CC TKE      deadlift squat  Reaching 15lbs kettlebell off step and setting back down 3 x 5 reps off black step    rockerboard      Balance      Lateral band walks      Monster Walks  3 full laps green band- fwd/bwd    Bridging      Triple threats      Stool Scoots      PRE      Extension  3 x 10 40 lbs SL RANGE:   Flexion  3x10 SL 40, 40, 45 lbs  RANGE:         Cable Column            Leg Press  3 x 10 80 lbs SL-  RANGE:         Bike  X 5 mins    Treadmill            Manual treatment      Myofascial stick massage      IASTM          Patient education: Pt was educated on PT diagnosis, prognosis, and plan of care. Pt was educated on the use of ice throughout the day. Reviewed insurance benefits for physical therapy in an outpatient hospital based setting with the patient, including deductible of met and allowable visit number. Pt was informed of possible out of pocket costs      Therapeutic Exercise and NMR EXR  [x] (53365) Provided verbal/tactile cueing for activities related to strengthening, flexibility, endurance, ROM for improvements in LE, proximal hip, and core control with self care, mobility, lifting, ambulation.  [] (89396) Provided verbal/tactile cueing for activities related to improving balance, coordination, kinesthetic sense, posture, motor skill, proprioception  to assist with LE, proximal hip, and core control in self care, mobility, lifting, ambulation and eccentric single leg control.      NMR and Therapeutic Activities:    [x] (67983 or 47409) Provided verbal/tactile cueing for activities related to improving balance, coordination, kinesthetic sense, posture, motor skill, proprioception and motor activation to allow for proper function of core, proximal hip and LE with self care and ADLs  [] (34644) Gait Re-education- Provided training and instruction to the patient for proper LE, core and proximal hip recruitment and positioning and eccentric body weight control with ambulation re-education including up and down stairs     Home Exercise Program:    [x] (12627) Reviewed/Progressed HEP activities related to strengthening, flexibility, endurance, ROM of core, proximal hip and LE for functional self-care, mobility, lifting and ambulation/stair navigation   [] (58996)Reviewed/Progressed HEP activities related to improving balance, coordination, kinesthetic sense, posture, motor skill, proprioception of core, proximal hip and LE for self care, mobility, lifting, and ambulation/stair navigation    Access Code: [email or web address]  URL: [email or web address]  Date: 10/14/2021  Prepared by: China Ashby    Exercises  Seated Table Hamstring Stretch - 2 x daily - 7 x weekly - 5 reps - 1 sets - 30 hold  Standing Gastroc Stretch - 2 x daily - 7 x weekly - 1 sets - 5 reps - 30 hold  Standing ITB Stretch - 1 x daily - 7 x weekly - 1 sets - 3-5 reps - 30 hold  Sitting Heel Slide with Towel - 2 x daily - 7 x weekly - 10 reps - 1 sets - 10 hold  Beginner Bridge - 1 x daily - 7 x weekly - 3 sets - 5-8 reps  Standing Hip Abduction with Counter Support - 1 x daily - 7 x weekly - 3 sets - 10 reps  Standing March - 1 x daily - 7 x weekly - 3 sets - 10 reps  Sitting Knee Extension with Resistance - 1 x daily - 7 x weekly - 3 sets - 10 reps  Standing Hamstring Curl with Resistance - 1 x daily - 7 x weekly - 3 sets - 10 reps  Standing Heel Raise - 1 x daily - 7 x weekly - 3 sets - 10 reps  Single Leg Stance with Support - 1 x daily - 7 x weekly - 1 sets - 3 reps - 30 hold  Clamshell - 1 x daily - 7 x weekly - 3 sets - 10 reps  Sidelying Hip Abduction - 1 x daily - 7 x weekly - 3 sets - 10 reps      Manual Treatments:  PROM / STM / Oscillations-Mobs:  G-I, II, III, IV (PA's, Inf., Post.)  [x] (19352) Provided manual therapy to mobilize LE, proximal hip and/or LS spine soft tissue/joints for the purpose of modulating pain, promoting relaxation,  increasing ROM, reducing/eliminating soft tissue swelling/inflammation/restriction, improving soft tissue extensibility and allowing for proper ROM for normal function with self care, mobility, lifting and ambulation. Modalities: Will ice at home   [] GAME READY (VASO)- for significant edema, swelling, pain control. Charges:  Timed Code Treatment Minutes: 45   Total Treatment Minutes: 50     [] EVAL (LOW) 92731   [] EVAL (MOD) 33258   [] EVAL (HIGH) 24335   [] RE-EVAL   [x] XG(55920) x   1  [] IONTO  [x] NMR (38812) x  1   [] VASO  [] Manual (53663) x      [] Other:  [x] TA x1      [] Mech Traction (19505)  [] ES(attended) (06425)      [] ES (un) (00566):       GOALS: Patient stated goal: make L hip feel as good as R hip. Get back to walking and hiking      Therapist goals for Patient:  Short Term Goals: To be achieved in: 2 weeks  1. Independent in HEP and progression per patient tolerance, in order to prevent re-injury. []? Progressing: [x]? Met: []? Not Met: []? Adjusted   2. Patient will have a decrease in pain to facilitate improvement in movement, function, and ADLs as indicated by Functional Deficits. []? Progressing: [x]? Met: []? Not Met: []? Adjusted     Long Term Goals: To be achieved in: 8 weeks  1. Disability index score of 16% or less for the [facility] to assist with reaching prior level of function. []? Progressing: [x]? Met: []? Not Met: []? Adjusted  2. Patient will demonstrate increased L hip AROM to 100 flex, 40 abd to allow for proper joint functioning as indicated by patients Functional Deficits. [x]? Progressing: []? Met: [x]? Not Met: []? Adjusted  3.  Patient will demonstrate an increase in L hip strength to 4/5 hip flex and abd and extn in LE to allow for proper functional mobility as indicated by patients Functional Deficits. [x]? Progressing: []? Met: [x]? Not Met: []? Adjusted  4. Patient will return to full community ambulation including up and down steps without increased symptoms or restriction. []? Progressing: [x]? Met: []? Not Met: []? Adjusted  5. Pt will return to going on 1 mile walk without pain or limitation in L hip. [x]? Progressing: hasn't tried but thinks he could  []? Met: []? Not Met: []? Adjusted           Overall Progression Towards Functional goals/ Treatment Progress Update:  [x] Patient is progressing as expected towards functional goals listed. [] Progression is slowed due to complexities/Impairments listed. [] Progression has been slowed due to co-morbidities. [] Plan just implemented, too soon to assess goals progression <30days   [] Goals require adjustment due to lack of progress  [] Patient is not progressing as expected and requires additional follow up with physician  [] Other    Prognosis for POC: [x] Good [] Fair  [] Poor      Patient requires continued skilled intervention: [x] Yes  [] No      ASSESSMENT:      Treatment/Activity Tolerance:  [x] Patient tolerated treatment well [] Patient limited by fatique  [] Patient limited by pain  [] Patient limited by other medical complications  [x] Other:  Pt described muscle fatigue and soreness after last session likely from the two new exercises added last session. He was again fatigued with SLDL exercise so only did 15 of these today. He tolerated monster walks added in today well. Overall pt was fatigued by progressions in session but no pain throughout. Pt will continue to benefit from skilled PT to progress LE strength, stability/balance, hip ROM and LE flexibility to improve ability to perform dressing, ADLs, and housework without limitation due to hip.        Return to Play: (if applicable)   []  Stage 1: Intro to Strength   []  Stage 2: Return to Run and Strength   [] Stage 3: Return to Jump and Strength   []  Stage 4: Dynamic Strength and Agility   []  Stage 5: Sport Specific Training     []  Ready to Return to Play, Meets All Above Stages   []  Not Ready for Return to Sports   Comments:            Prognosis: [x] Good [] Fair  [] Poor    Patient Requires Follow-up: [x] Yes  [] No    PLAN: 1-2x/week for 5 more weeks (11/5/21); [x] Continue per plan of care [] Alter current plan (see comments above)  [] Plan of care initiated [] Hold pending MD visit [] Discharge    Note: If patient does not return for scheduled/ recommended follow up visits, this note will serve as a discharge from care along with most recent update on progress.      Electronically signed by: Leela Escamilla PT, DPT

## 2021-11-30 ENCOUNTER — HOSPITAL ENCOUNTER (OUTPATIENT)
Dept: PHYSICAL THERAPY | Age: 56
Setting detail: THERAPIES SERIES
Discharge: HOME | End: 2021-11-30
Payer: COMMERCIAL

## 2021-11-30 PROCEDURE — 97530 THERAPEUTIC ACTIVITIES: CPT | Performed by: PHYSICAL THERAPIST

## 2021-11-30 PROCEDURE — 97110 THERAPEUTIC EXERCISES: CPT | Performed by: PHYSICAL THERAPIST

## 2021-11-30 PROCEDURE — 97112 NEUROMUSCULAR REEDUCATION: CPT | Performed by: PHYSICAL THERAPIST

## 2021-11-30 NOTE — FLOWSHEET NOTE
[facility], [place]      Physical Therapy Daily Treatment Note  Date:  2021    Patient Name:  Anna Velarde    :  1965  MRN: [id number]    Medical/Treatment Diagnosis Information:  · Diagnosis: W69.036 (ICD-10-CM) - H/O total hip arthroplasty, left on 9/15/21  · Treatment Diagnosis: M25.552 (L) hip pain; M62.81 muscle weakness  Insurance/Certification information:  PT Insurance Information: [facility]- no auth  Physician Information:  Referring Practitioner: Dr. Olya Acosta MD  Has the plan of care been signed (Y/N):        [x]  Yes  []  No     Date of Patient follow up with Physician: 21      Is this a Progress Report:     []  Yes  [x]  No        If Yes:  Date Range for reporting period:  Beginning- 10/5/2021  Ending -21    Progress report will be due (10 Rx or 30 days whichever is less): 22      Recertification will be due (POC Duration  / 90 days whichever is less): as above        Visit # Insurance Allowable Auth Required   , used one priorly this year []  Yes [x]  No        Functional Scale: WOMAC- 8.3%    Date assessed:  2021     Latex Allergy:  [x]NO      []YES  Preferred Language for Healthcare:   [x]English       []other:    Pain level:  0/10  on 2021    SUBJECTIVE:  Pt is 10.5 week s/p. Pt is feeling good. He felt good after last session and did not have the spasm in his glute like he did after the session before. Pt reports he was able to sit at his desk with his L leg crossed underneath him the other day how he used to sit. OBJECTIVE: 21   Observation:    Test measurements:    Joint mobility: n/t              []?Normal                       []?Hypo              []?Hyper     Palpation:     Functional Mobility/Transfers: Indep     Posture:  WNL     Bandages/Dressings/Incisions: pt reports no concerns about incision     Gait: (include devices/WB status) Indep and flexion prior to knee and avoid knees over toes   Single leg dead-lift   1 x 10, --  Golfers lift holding onto table for balance Difficult for pt to maintain balance   Posterior lunge on slider       Standing hip abd into band  3 x 10 B green band around ankles    Isometric hip abduction into wall       Hip hikes      CC TKE      deadlift squat    Tapping 11 lb med ball to step 3 x 10     rockerboard      Balance      Lateral band walks      [employer]  3 full laps green band- fwd/bwd    plyoback 4# 3 x 15 throws    Bridging  On SB 3 x 10    Triple threats      Stool Scoots      PRE      Extension  RANGE:   Flexion  RANGE:         Cable Column            Leg Press  3 x 10 90 lbs SL-  RANGE:         Bike  X 5 mins    Treadmill            Manual treatment      Myofascial stick massage      IASTM          Patient education: Pt was educated on PT diagnosis, prognosis, and plan of care. Pt was educated on the use of ice throughout the day. Reviewed insurance benefits for physical therapy in an outpatient hospital based setting with the patient, including deductible of met and allowable visit number. Pt was informed of possible out of pocket costs      Therapeutic Exercise and NMR EXR  [x] (14230) Provided verbal/tactile cueing for activities related to strengthening, flexibility, endurance, ROM for improvements in LE, proximal hip, and core control with self care, mobility, lifting, ambulation.  [] (32821) Provided verbal/tactile cueing for activities related to improving balance, coordination, kinesthetic sense, posture, motor skill, proprioception  to assist with LE, proximal hip, and core control in self care, mobility, lifting, ambulation and eccentric single leg control.      NMR and Therapeutic Activities:    [x] (15316 or 19158) Provided verbal/tactile cueing for activities related to improving balance, coordination, kinesthetic sense, posture, motor skill, proprioception and motor activation to allow for proper function of core, proximal hip and LE with self care and ADLs  [] (65716) Gait Re-education- Provided training and instruction to the patient for proper LE, core and proximal hip recruitment and positioning and eccentric body weight control with ambulation re-education including up and down stairs     Home Exercise Program:    [x] (59874) Reviewed/Progressed HEP activities related to strengthening, flexibility, endurance, ROM of core, proximal hip and LE for functional self-care, mobility, lifting and ambulation/stair navigation   [] (10889)Reviewed/Progressed HEP activities related to improving balance, coordination, kinesthetic sense, posture, motor skill, proprioception of core, proximal hip and LE for self care, mobility, lifting, and ambulation/stair navigation    Access Code: [email or web address]  URL: [email or web address]  Date: 10/14/2021  Prepared by: Rachael Bend    Exercises  Seated Table Hamstring Stretch - 2 x daily - 7 x weekly - 5 reps - 1 sets - 30 hold  Standing Gastroc Stretch - 2 x daily - 7 x weekly - 1 sets - 5 reps - 30 hold  Standing ITB Stretch - 1 x daily - 7 x weekly - 1 sets - 3-5 reps - 30 hold  Sitting Heel Slide with Towel - 2 x daily - 7 x weekly - 10 reps - 1 sets - 10 hold  Beginner Bridge - 1 x daily - 7 x weekly - 3 sets - 5-8 reps  Standing Hip Abduction with Counter Support - 1 x daily - 7 x weekly - 3 sets - 10 reps  Standing March - 1 x daily - 7 x weekly - 3 sets - 10 reps  Sitting Knee Extension with Resistance - 1 x daily - 7 x weekly - 3 sets - 10 reps  Standing Hamstring Curl with Resistance - 1 x daily - 7 x weekly - 3 sets - 10 reps  Standing Heel Raise - 1 x daily - 7 x weekly - 3 sets - 10 reps  Single Leg Stance with Support - 1 x daily - 7 x weekly - 1 sets - 3 reps - 30 hold  Clamshell - 1 x daily - 7 x weekly - 3 sets - 10 reps  Sidelying Hip Abduction - 1 x daily - 7 x weekly - 3 sets - 10 reps      Manual Treatments:  PROM / STM / Oscillations-Mobs:  BELLE, II, III, IV (PA's, Inf., Post.)  [x] (01266) Provided manual therapy to mobilize LE, proximal hip and/or LS spine soft tissue/joints for the purpose of modulating pain, promoting relaxation,  increasing ROM, reducing/eliminating soft tissue swelling/inflammation/restriction, improving soft tissue extensibility and allowing for proper ROM for normal function with self care, mobility, lifting and ambulation. Modalities:     [] GAME READY (VASO)- for significant edema, swelling, pain control. Charges:  Timed Code Treatment Minutes: 45   Total Treatment Minutes: 45     [] EVAL (LOW) 16912   [] EVAL (MOD) 84851   [] EVAL (HIGH) 40300   [] RE-EVAL   [x] UP(76359) x   1  [] IONTO  [x] NMR (06586) x  1   [] VASO  [] Manual (31423) x      [] Other:  [x] TA x1      [] Mech Traction (59023)  [] ES(attended) (97463)      [] ES (un) (73869):       GOALS: Patient stated goal: make L hip feel as good as R hip. Get back to walking and hiking      Therapist goals for Patient:  Short Term Goals: To be achieved in: 2 weeks  1. Independent in HEP and progression per patient tolerance, in order to prevent re-injury. []? Progressing: [x]? Met: []? Not Met: []? Adjusted   2. Patient will have a decrease in pain to facilitate improvement in movement, function, and ADLs as indicated by Functional Deficits. []? Progressing: [x]? Met: []? Not Met: []? Adjusted     Long Term Goals: To be achieved in: 8 weeks  1. Disability index score of 16% or less for the [facility] to assist with reaching prior level of function. []? Progressing: [x]? Met: []? Not Met: []? Adjusted  2. Patient will demonstrate increased L hip AROM to 100 flex, 40 abd to allow for proper joint functioning as indicated by patients Functional Deficits. [x]? Progressing: []? Met: [x]? Not Met: []? Adjusted  3.  Patient will demonstrate an increase in L hip strength to 4/5 hip flex and abd and extn in LE to allow for proper functional mobility as indicated by patients Functional Deficits. [x]? Progressing: []? Met: [x]? Not Met: []? Adjusted  4. Patient will return to full community ambulation including up and down steps without increased symptoms or restriction. []? Progressing: [x]? Met: []? Not Met: []? Adjusted  5. Pt will return to going on 1 mile walk without pain or limitation in L hip. [x]? Progressing: hasn't tried but thinks he could  []? Met: []? Not Met: []? Adjusted           Overall Progression Towards Functional goals/ Treatment Progress Update:  [x] Patient is progressing as expected towards functional goals listed. [] Progression is slowed due to complexities/Impairments listed. [] Progression has been slowed due to co-morbidities. [] Plan just implemented, too soon to assess goals progression <30days   [] Goals require adjustment due to lack of progress  [] Patient is not progressing as expected and requires additional follow up with physician  [] Other    Prognosis for POC: [x] Good [] Fair  [] Poor      Patient requires continued skilled intervention: [x] Yes  [] No      ASSESSMENT:      Treatment/Activity Tolerance:  [x] Patient tolerated treatment well [] Patient limited by fatique  [] Patient limited by pain  [] Patient limited by other medical complications  [x] Other: Pt was progressed LE strength and stability today. He was challenged by dynamic balance on plyoback throws. Pt was again fatigued with deadlift squats and SLDL reaches. He was fatigued by today's session. Pt will continue to benefit from skilled PT to progress LE strength, stability/balance, hip ROM and LE flexibility to improve ability to perform dressing, ADLs, and housework without limitation due to hip.        Return to Play: (if applicable)   []  Stage 1: Intro to Strength   []  Stage 2: Return to Run and Strength   []  Stage 3: Return to Jump and Strength   []  Stage 4: Dynamic Strength and Agility   []  Stage 5: Sport Specific Training     []  Ready to Return to Play, Meets All Above Stages   []  Not Ready for Return to Sports   Comments:            Prognosis: [x] Good [] Fair  [] Poor    Patient Requires Follow-up: [x] Yes  [] No    PLAN: 1-2x/week for 5 more weeks (11/5/21); [x] Continue per plan of care [] Alter current plan (see comments above)  [] Plan of care initiated [] Hold pending MD visit [] Discharge    Note: If patient does not return for scheduled/ recommended follow up visits, this note will serve as a discharge from care along with most recent update on progress.      Electronically signed by: Leela Escamilla, PT, DPT

## 2021-12-07 ENCOUNTER — APPOINTMENT (OUTPATIENT)
Dept: PHYSICAL THERAPY | Age: 56
End: 2021-12-07
Payer: COMMERCIAL

## 2021-12-09 ENCOUNTER — OFFICE VISIT (OUTPATIENT)
Dept: ORTHOPEDIC SURGERY | Age: 56
End: 2021-12-09

## 2021-12-09 VITALS — WEIGHT: 229 LBS | BODY MASS INDEX: 30.35 KG/M2 | HEIGHT: 73 IN | RESPIRATION RATE: 16 BRPM

## 2021-12-09 DIAGNOSIS — Z96.642 HISTORY OF TOTAL HIP ARTHROPLASTY, LEFT: Primary | ICD-10-CM

## 2021-12-09 PROCEDURE — 99024 POSTOP FOLLOW-UP VISIT: CPT | Performed by: PHYSICIAN ASSISTANT

## 2021-12-14 ENCOUNTER — HOSPITAL ENCOUNTER (OUTPATIENT)
Dept: PHYSICAL THERAPY | Age: 56
Setting detail: THERAPIES SERIES
Discharge: HOME | End: 2021-12-14
Payer: COMMERCIAL

## 2021-12-14 PROCEDURE — 97110 THERAPEUTIC EXERCISES: CPT | Performed by: PHYSICAL THERAPIST

## 2021-12-14 PROCEDURE — 97530 THERAPEUTIC ACTIVITIES: CPT | Performed by: PHYSICAL THERAPIST

## 2021-12-14 NOTE — PLAN OF CARE
Tony Mitchell 89  Physical Therapy Discharge Note      Dear  Dr. Maria Antonia Finch,    We had the pleasure of treating the following patient for physical therapy services at [address] [place] [address]. A summary of our findings can be found in the updated assessment below. This includes our plan of care. If you have any questions or concerns regarding these findings, please do not hesitate to contact me at the office phone number checked above. Thank you for the referral.     Physician Signature:________________________________Date:__________________  By signing above (or electronic signature), therapists plan is approved by physician    Total Visits to Date: 15  Overall Response to Treatment:   [x]Patient is responding well to treatment and improvement is noted with regards  to goals   []Patient should continue to improve in reasonable time if they continue HEP   []Patient has plateaued and is no longer responding to skilled PT intervention    []Patient is getting worse and would benefit from return to referring MD   []Patient unable to adhere to initial POC   [x]Other: Pt has done very well post op JOON. He has been consistent in doing PT and HEP between visits. He shows good and improved hip ROM and flexibility and good strength throughout his hip today. He has been able to walk 2 miles a day and not longer feels limited in his functional mobility or what he needs to do for household work/chores. Pt has met to mostly met all PT goals so he will be d/c to HEP. Did discuss benefits of continued long term strengthening and stretching of LEs/hips to maintain strength and prevent return to pain and pt was agreeable.          Physical Therapy Daily Treatment Note  Date:  2021    Patient Name:  Carlyle Taylor    :  1965  MRN: [id number]    Medical/Treatment Diagnosis Information:  · Diagnosis: Q08.011 (ICD-10-CM) - H/O total hip arthroplasty, left on 9/15/21  · Treatment Diagnosis: M25.552 (L) hip pain; M62.81 muscle weakness  Insurance/Certification information:  PT Insurance Information: [facility]- no auth  Physician Information:  Referring Practitioner: Dr. Di Reed MD  Has the plan of care been signed (Y/N):        [x]  Yes  []  No     Date of Patient follow up with Physician: 12/9/21      Is this a Progress Report:     []  Yes  [x]  No        If Yes:  Date Range for reporting period:  Beginning- 10/5/2021  Ending -12/14/21    Progress report will be due (10 Rx or 30 days whichever is less): 4/65/60      Recertification will be due (POC Duration  / 90 days whichever is less): as above        Visit # Insurance Allowable Auth Required   14 19, used one priorly this year []  Yes [x]  No        Functional Scale: WOMAC- 3.1%    Date assessed:  12/14/2021     Latex Allergy:  [x]NO      []YES  Preferred Language for Healthcare:   [x]English       []other:    Pain level:  0/10  on 12/14/2021    SUBJECTIVE:  Pt is 3 months post op. Pt saw PA at surgeons office and said looking good. Pt went to [facility] two weekends ago and did a lot of walking up to 2 miles a day with no pain or problems. He has had no problems with oing up and down steps. He does not feel like he very limited function wise. He just feels a little tight still. He has not attempted full deep squat yet. OBJECTIVE: 12/14/21   Observation:    Test measurements:    Joint mobility: n/t              []?Normal                       []?Hypo              []?Hyper     Palpation:     Functional Mobility/Transfers: Indep     Posture:  WNL     Bandages/Dressings/Incisions: pt reports no concerns about incision     Gait: (include devices/WB status) Indep and mild decreased hip extn B, mild decreased L hip flex     Single leg stance eyes open- R: 30 secs ;L: 30 secs more shaky than R              SLS eyes closed- R: ; L:      Squats: even WBing, no pain          ROM PROM AROM Comments     Left Right Left Right     Flexion     98 93     Extension      0 6      Abduction         Adduction             ER 50 30         IR 28 37                          Strength Left Right Comments   Hip flexors 4+ 4+     Hip extension  4  4     Hip abduction  4+  4+     Hip adduction         Hip ER         Hip IR         Quads 5 5     Hamstrings 5 5        Flexibility Left Right Comments   Hamstrings 78 70 90/90 position   ITB (Obers test)         Hip flexor(Navjot test)  fair - Fair -       gastroc Fair fair     Rectus femoris(Elys test)  5 in  2 in Heel to buttock distance                    RESTRICTIONS/PRECAUTIONS: none    Exercises/Interventions:     Exercise/Equipment Resistance Repetitions Other comments   bike      Stretching      Hamstring     Hip Flexor     ITB- standing      Grion      Quad  3 x 30 secs prone    Inclined Calf    runners in HEP   Towel Pull      Piriformis- figure 4  Supine 3 x 30 secs, crossing foot over knee, gentle stretch    Knee to shoulder  3 x 30 secs                SLR      Hip flexion      Hip extension Bent over table  Cues for glute set hold at top   Hip Abduction  4#cuff weight   Standing hip marches     LAQ     Standing HS curl          Hip Adducton     SLR+     clamshells  Green resistance band; VC for controlling motion   Isometrics      Quad sets      Ball Squeezes      Patellar Glides      Medial      Superior      Inferior            ROM      Working on hip ROM   Active      Weight Shift      Hang Weights      Sheet Pulls      Ankle Pumps            CKC      Calf raises      Wall sits     Step ups     1 leg stand     Step touch down      Squatting  Cues to perform with hip flexion prior to knee and avoid knees over toes   Single leg dead-lift  Difficult for pt to maintain balance   Posterior lunge on slider     Standing hip abd into band     Isometric hip abduction into wall     Hip hikes     CC TKE     deadlift squat     rockerboard     Balance Lateral band walks     [employer]     plyoback 4#    Bridging     Triple threats      Stool Scoots      PRE      Extension  3 x 10 40 lbs SL RANGE:   Flexion  3x10 SL 40, 45, 45 lbs  RANGE:         Cable Column            Leg Press  3 x 10 90 lbs SL-  RANGE:         Bike  X 5 mins    Treadmill            Manual treatment      Myofascial stick massage      IASTM          Patient education: Pt was educated on PT diagnosis, prognosis, and plan of care. Pt was educated on the use of ice throughout the day. Reviewed insurance benefits for physical therapy in an outpatient hospital based setting with the patient, including deductible of met and allowable visit number. Pt was informed of possible out of pocket costs      Therapeutic Exercise and NMR EXR  [x] (39740) Provided verbal/tactile cueing for activities related to strengthening, flexibility, endurance, ROM for improvements in LE, proximal hip, and core control with self care, mobility, lifting, ambulation.  [] (66202) Provided verbal/tactile cueing for activities related to improving balance, coordination, kinesthetic sense, posture, motor skill, proprioception  to assist with LE, proximal hip, and core control in self care, mobility, lifting, ambulation and eccentric single leg control.      NMR and Therapeutic Activities:    [x] (80856 or 32202) Provided verbal/tactile cueing for activities related to improving balance, coordination, kinesthetic sense, posture, motor skill, proprioception and motor activation to allow for proper function of core, proximal hip and LE with self care and ADLs  [] (38753) Gait Re-education- Provided training and instruction to the patient for proper LE, core and proximal hip recruitment and positioning and eccentric body weight control with ambulation re-education including up and down stairs     Home Exercise Program:    [x] (84305) Reviewed/Progressed HEP activities related to strengthening, flexibility, endurance, ROM of core, proximal hip and LE for functional self-care, mobility, lifting and ambulation/stair navigation   [] (92695)Reviewed/Progressed HEP activities related to improving balance, coordination, kinesthetic sense, posture, motor skill, proprioception of core, proximal hip and LE for self care, mobility, lifting, and ambulation/stair navigation    Access Code: [email or web address]  URL: [email or web address]  Date: 12/14/2021  Prepared by: Tomás Payan  Standing Hip Flexor Stretch - 1 x daily - 7 x weekly - 1 sets - 3 reps - 30 hold  Supine Hip External Rotation Stretch - 1 x daily - 7 x weekly - 1 sets - 3 reps - 30 hold  Hooklying Single Knee to Chest Stretch - 2 x daily - 7 x weekly - 5 reps - 1 sets - 30 hold  Prone Quadriceps Stretch with Strap - 2 x daily - 7 x weekly - 5 reps - 1 sets - 30 hold  Seated Table Hamstring Stretch - 2 x daily - 7 x weekly - 5 reps - 1 sets - 30 hold  Bridge with Arms at [employer] and Feet on [employer] [place] - 1 x daily - 7 x weekly - 3 sets - 10 reps  Beginner Bridge - 1 x daily - 7 x weekly - 3 sets - 5-8 reps  Standing Hip Abduction with Counter Support - 1 x daily - 7 x weekly - 3 sets - 10 reps  Standing March - 1 x daily - 7 x weekly - 3 sets - 10 reps  Sitting Knee Extension with Resistance - 1 x daily - 7 x weekly - 3 sets - 10 reps  Standing Hamstring Curl with Resistance - 1 x daily - 7 x weekly - 3 sets - 10 reps  Single Leg Stance with Support - 1 x daily - 7 x weekly - 1 sets - 3 reps - 30 hold  Clamshell - 1 x daily - 7 x weekly - 3 sets - 10 reps  Sidelying Hip Abduction - 1 x daily - 7 x weekly - 3 sets - 10 reps  Side Stepping with Resistance at Thighs - 1 x daily - 7 x weekly - 3 sets - 10 reps  Forward T with Counter Support - 1 x daily - 7 x weekly - 3 sets - 10 reps  Reverse Lunge on Slider - 1 x daily - 7 x weekly - 3 sets - 10 reps  Lateral Step Down - 1 x daily - 7 x weekly - 3 sets - 10 reps        Manual Treatments:  PROM / STM / Oscillations-Mobs:  G-I, II, III, IV (Komal, Inf., Post.)  [x] (70115) Provided manual therapy to mobilize LE, proximal hip and/or LS spine soft tissue/joints for the purpose of modulating pain, promoting relaxation,  increasing ROM, reducing/eliminating soft tissue swelling/inflammation/restriction, improving soft tissue extensibility and allowing for proper ROM for normal function with self care, mobility, lifting and ambulation. Modalities:     [] GAME READY (VASO)- for significant edema, swelling, pain control. Charges:  Timed Code Treatment Minutes: 40   Total Treatment Minutes: 45     [] EVAL (LOW) 84378   [] EVAL (MOD) 77902   [] EVAL (HIGH) 44420   [] RE-EVAL   [x] QD(34294) x   2  [] IONTO  [] NMR (44354) x     [] VASO  [] Manual (03049) x      [] Other:  [x] TA x1      [] Mech Traction (41805)  [] ES(attended) (93172)      [] ES (un) (90263):       GOALS: Patient stated goal: make L hip feel as good as R hip. Get back to walking and hiking      Therapist goals for Patient:  Short Term Goals: To be achieved in: 2 weeks  1. Independent in HEP and progression per patient tolerance, in order to prevent re-injury. []? Progressing: [x]? Met: []? Not Met: []? Adjusted   2. Patient will have a decrease in pain to facilitate improvement in movement, function, and ADLs as indicated by Functional Deficits. []? Progressing: [x]? Met: []? Not Met: []? Adjusted     Long Term Goals: To be achieved in: 8 weeks  1. Disability index score of 16% or less for the [facility] to assist with reaching prior level of function. []? Progressing: [x]? Met: []? Not Met: []? Adjusted  2. Patient will demonstrate increased L hip AROM to 100 flex, 40 abd to allow for proper joint functioning as indicated by patients Functional Deficits. [x]? Progressin flex, not assessed abd today []? Met: []? Not Met: []? Adjusted  3.  Patient will demonstrate an increase in L hip strength to 4/5 hip flex and abd and extn in LE to allow for proper functional mobility as indicated by patients Functional Deficits. []? Progressing:  [x]? Met: []? Not Met: []? Adjusted  4. Patient will return to full community ambulation including up and down steps without increased symptoms or restriction. []? Progressing: [x]? Met: []? Not Met: []? Adjusted  5. Pt will return to going on 1 mile walk without pain or limitation in L hip.   []? Progressing:   [x]? Met: []? Not Met: []? Adjusted           Overall Progression Towards Functional goals/ Treatment Progress Update:  [x] Patient is progressing as expected towards functional goals listed. [] Progression is slowed due to complexities/Impairments listed. [] Progression has been slowed due to co-morbidities. [] Plan just implemented, too soon to assess goals progression <30days   [] Goals require adjustment due to lack of progress  [] Patient is not progressing as expected and requires additional follow up with physician  [] Other    Prognosis for POC: [x] Good [] Fair  [] Poor      Patient requires continued skilled intervention: [] Yes  [x] No      ASSESSMENT:      Treatment/Activity Tolerance:  [x] Patient tolerated treatment well [] Patient limited by fatique  [] Patient limited by pain  [] Patient limited by other medical complications  [x] Other: Pt has done very well post op JOON. He has been consistent in doing PT and HEP between visits. He shows good and improved hip ROM and flexibility and good strength throughout his hip today. He has been able to walk 2 miles a day and not longer feels limited in his functional mobility or what he needs to do for household work/chores. Pt has met to mostly met all PT goals so he will be d/c to HEP. Did discuss benefits of continued long term strengthening and stretching of LEs/hips to maintain strength and prevent return to pain and pt was agreeable.        Return to Play: (if applicable)   []  Stage 1: Intro to Strength   []  Stage 2: Return to Run and Strength   []  Stage 3: Return to Jump and Strength   []  Stage 4: Dynamic Strength and Agility   []  Stage 5: Sport Specific Training     []  Ready to Return to Play, Meets All Above Stages   []  Not Ready for Return to Sports   Comments:            Prognosis: [x] Good [] Fair  [] Poor    Patient Requires Follow-up: [] Yes  [x] No    PLAN:   [] Continue per plan of care [] Alter current plan (see comments above)  [] Plan of care initiated [] Hold pending MD visit [x] Discharge    Note: If patient does not return for scheduled/ recommended follow up visits, this note will serve as a discharge from care along with most recent update on progress.      Electronically signed by: Le Mancia PT, DPT

## 2021-12-14 NOTE — PROGRESS NOTES
This dictation was done with [employer] dictation and may contain mechanical errors related to translation. I have today reviewed with Martín Cordova the clinically relevant, past medical history, medications, allergies, family history, social history, and Review Of Systems form the patients most recent history form & I have documented any details relevant to today's presenting complaints in my history below. Mr. Kory Anna's self-reported past medical history, medications, allergies, family history, social history, and Review Of Systems form has been scanned into the chart under the \"Media\" tab. Subjective:  Martín Cordova is a 64 y.o. who is here in follow-up for his left total hip replacement dating back to 9/15/2021. He has been scheduled this full-time he continues to do excellent. His incision is healed nicely neurologically he is intact down the left leg he was sent for x-rays including an AP pelvis and a 2 view left hip      Patient Active Problem List   Diagnosis    Arthritis of right hip    Primary osteoarthritis of left hip           Current Outpatient Medications on File Prior to Visit   Medication Sig Dispense Refill    metoprolol succinate (TOPROL XL) 50 MG extended release tablet Take 50 mg by mouth every evening      aspirin 81 MG EC tablet Take 1 tablet by mouth 2 times daily 60 tablet 0    meloxicam (MOBIC) 7.5 MG tablet Take 1 tablet by mouth daily for 5 days 5 tablet 0    pregabalin (LYRICA) 75 MG capsule Take 1 capsule by mouth 2 times daily for 14 days. 28 capsule 0    amLODIPine (NORVASC) 5 MG tablet Take 10 mg by mouth daily       losartan-hydrochlorothiazide (HYZAAR) 100-25 MG per tablet Take 1 tablet by mouth daily        No current facility-administered medications on file prior to visit. Objective:   Resp. rate 16, height 6' 1\" (1.854 m), weight 229 lb (103.9 kg).     On examination he shows good strength he walks without antalgia is neurovascularly intact to his left foot  Neuro exam grossly intact both lower extremities. Intact sensation to light touch. Motor exam 4+ to 5/5 in all major motor groups. Negative Alva's sign. Skin is warm, dry and intact with out erythema or significant increased temperature around the knee joint(s). There are no cutaneous lesions or lymphadenopathy present. X-RAYS:  Three views of the total Hip arthroplasty were done today including an AP pelvis and a 2 view hip. This reveals satisfactory alignment of the prosthesis with good sizing and fit. There is good version of the cup and no subsiding of the stem. . No signs of significant polyethylene wear or failure. No progressive radiolucencies,fractures, tumors or dislocations. These x-rays included the left hip total hip replacement      Assessment:  Left hip total hip replacement stable    Plan:  During today's visit, there was approximately 20 minutes of face-to-face discussion in regards to the patient's current condition and treatment options. More than 50 % of the time was counseling and coordination of care as indicated above.   At this point we talked about the use of prophylactic antibiotics and the need to continue exercises and follow-up with us in 1 year      PROCEDURE NOTE:   X-rays examination discussion      They will schedule a follow up in annually

## 2021-12-21 ENCOUNTER — APPOINTMENT (OUTPATIENT)
Dept: PHYSICAL THERAPY | Age: 56
End: 2021-12-21
Payer: COMMERCIAL

## 2021-12-28 ENCOUNTER — APPOINTMENT (OUTPATIENT)
Dept: PHYSICAL THERAPY | Age: 56
End: 2021-12-28
Payer: COMMERCIAL

## 2022-07-25 ENCOUNTER — TELEPHONE (OUTPATIENT)
Dept: ORTHOPEDIC SURGERY | Age: 57
End: 2022-07-25

## 2022-07-25 NOTE — TELEPHONE ENCOUNTER
General Question     Subject: CANCEL 1ST APPT  Patient and /or Facility Request: Virgil Garcia  Contact Number: [phone]    PATIENT CALLED TO CANCEL HIS 1ST APPT ON July 28TH. Tanya Kraft PATIENT HAS JURY DUTY. HE DID NOT WISH TO RESCHEDULE AT THIS TIME. Tanya Kraft

## (undated) DEVICE — SOLUTION PREP POVIDONE IOD FOR SKIN MUCOUS MEM PRIOR TO

## (undated) DEVICE — DUAL CUT SAGITTAL BLADE

## (undated) DEVICE — C-ARM: Brand: UNBRANDED

## (undated) DEVICE — GLOVE ORTHO 8   MSG9480

## (undated) DEVICE — 6619 2 PTNT ISO SYS INCISE AREA&LT;(&GT;&&LT;)&GT;P: Brand: STERI-DRAPE™ IOBAN™ 2

## (undated) DEVICE — GLOVE SURG SZ 85 L12IN FNGR ORTHO 126MIL CRM LTX FREE

## (undated) DEVICE — SHEET,DRAPE,53X77,STERILE: Brand: MEDLINE

## (undated) DEVICE — Z DISCONTINUED USE 2716304 SUTURE STRATAFIX SPRL SZ 3-0 L12IN ABSRB UD FS-1 L24MM 3/8

## (undated) DEVICE — BASIC SINGLE BASIN 1-LF: Brand: MEDLINE INDUSTRIES, INC.

## (undated) DEVICE — SOLUTION IV 1000ML 0.9% SOD CHL FOR IRRIG PLAS CONT

## (undated) DEVICE — BIPOLAR SEALER 23-112-1 AQM 6.0: Brand: AQUAMANTYS ®

## (undated) DEVICE — ELECTRODE BLDE L4IN NONINSULATED EDGE

## (undated) DEVICE — SUTURE ABSORBABLE MONOFILAMENT 1-0 OS8 14 IN STRATAFIX SPRL SXPD2B202

## (undated) DEVICE — KIT POS FOAM HANA TBL

## (undated) DEVICE — 1010 S-DRAPE TOWEL DRAPE 10/BX: Brand: STERI-DRAPE™

## (undated) DEVICE — TOTAL BASIC: Brand: MEDLINE INDUSTRIES, INC.

## (undated) DEVICE — SOLUTION IV IRRIG POUR BRL 0.9% SODIUM CHL 2F7124

## (undated) DEVICE — RETRACTOR SURG WIDE FLAT LO PROF LTWT PHOTONGUIDE

## (undated) DEVICE — NEEDLE HYPO 18GA L1.5IN THN WALL PIVOTING SHLD BVL ORIENTED

## (undated) DEVICE — GLOVE SURG SZ 8 L12IN FNGR THK79MIL GRN LTX FREE

## (undated) DEVICE — 3M™ MICROPORE™ TAPE, 1530-1: Brand: 3M™ MICROPORE™

## (undated) DEVICE — SUTURE STRATAFIX SPRL SZ 2 0 L14IN ABSRB UD MH L36MM 1 2 CIR SXMD2B401

## (undated) DEVICE — PAD DRY FLOOR ABS 32X58IN GRN